# Patient Record
Sex: FEMALE | Race: WHITE | NOT HISPANIC OR LATINO | Employment: UNEMPLOYED | URBAN - METROPOLITAN AREA
[De-identification: names, ages, dates, MRNs, and addresses within clinical notes are randomized per-mention and may not be internally consistent; named-entity substitution may affect disease eponyms.]

---

## 2023-11-06 ENCOUNTER — INITIAL PRENATAL (OUTPATIENT)
Dept: OBSTETRICS AND GYNECOLOGY | Facility: HOSPITAL | Age: 28
End: 2023-11-06
Payer: COMMERCIAL

## 2023-11-06 ENCOUNTER — HOSPITAL ENCOUNTER (OUTPATIENT)
Dept: WOMENS IMAGING | Facility: HOSPITAL | Age: 28
Discharge: HOME | End: 2023-11-06
Admitting: OBSTETRICS & GYNECOLOGY
Payer: COMMERCIAL

## 2023-11-06 VITALS
WEIGHT: 210.2 LBS | HEIGHT: 70 IN | SYSTOLIC BLOOD PRESSURE: 110 MMHG | BODY MASS INDEX: 30.09 KG/M2 | DIASTOLIC BLOOD PRESSURE: 67 MMHG

## 2023-11-06 DIAGNOSIS — O36.80X0 ENCOUNTER TO DETERMINE FETAL VIABILITY OF PREGNANCY, SINGLE OR UNSPECIFIED FETUS: ICD-10-CM

## 2023-11-06 DIAGNOSIS — K59.04 CHRONIC IDIOPATHIC CONSTIPATION: ICD-10-CM

## 2023-11-06 DIAGNOSIS — O21.9 NAUSEA AND VOMITING OF PREGNANCY, ANTEPARTUM: ICD-10-CM

## 2023-11-06 DIAGNOSIS — O36.80X0 ENCOUNTER TO DETERMINE FETAL VIABILITY OF PREGNANCY, SINGLE OR UNSPECIFIED FETUS: Primary | ICD-10-CM

## 2023-11-06 LAB
BILIRUB BLD-MCNC: NEGATIVE MG/DL
CLARITY, POC: ABNORMAL
COLOR UR: YELLOW
GLUCOSE UR STRIP-MCNC: NEGATIVE MG/DL
KETONES UR QL: NEGATIVE
LEUKOCYTE EST, POC: NEGATIVE
NITRITE UR-MCNC: POSITIVE MG/ML
PH UR: 5 [PH] (ref 5–8)
PROT UR STRIP-MCNC: NEGATIVE MG/DL
RBC # UR STRIP: NEGATIVE /UL
SP GR UR: 1.01 (ref 1–1.03)
UROBILINOGEN UR QL: NORMAL

## 2023-11-06 PROCEDURE — 76801 OB US < 14 WKS SINGLE FETUS: CPT

## 2023-11-06 PROCEDURE — 87591 N.GONORRHOEAE DNA AMP PROB: CPT | Performed by: OBSTETRICS & GYNECOLOGY

## 2023-11-06 PROCEDURE — 87661 TRICHOMONAS VAGINALIS AMPLIF: CPT | Performed by: OBSTETRICS & GYNECOLOGY

## 2023-11-06 PROCEDURE — 87491 CHLMYD TRACH DNA AMP PROBE: CPT | Performed by: OBSTETRICS & GYNECOLOGY

## 2023-11-06 RX ORDER — ONDANSETRON 8 MG/1
8 TABLET, ORALLY DISINTEGRATING ORAL EVERY 8 HOURS PRN
Qty: 20 TABLET | Refills: 6 | Status: SHIPPED | OUTPATIENT
Start: 2023-11-06

## 2023-11-06 RX ORDER — POLYETHYLENE GLYCOL 3350 17 G/17G
17 POWDER, FOR SOLUTION ORAL DAILY
Qty: 510 G | Refills: 6 | Status: SHIPPED | OUTPATIENT
Start: 2023-11-06

## 2023-11-06 NOTE — PROGRESS NOTES
Pt here for New OB intake.  Will schedule next appointment today. Discussed new pt intake and materials.

## 2023-11-08 PROBLEM — Z3A.08 8 WEEKS GESTATION OF PREGNANCY: Status: ACTIVE | Noted: 2023-11-08

## 2023-11-08 LAB
C TRACH RRNA SPEC QL NAA+PROBE: NEGATIVE
N GONORRHOEA RRNA SPEC QL NAA+PROBE: NEGATIVE
T VAGINALIS RRNA SPEC QL NAA+PROBE: NEGATIVE

## 2023-11-22 PROBLEM — O21.9 NAUSEA AND VOMITING OF PREGNANCY, ANTEPARTUM: Status: ACTIVE | Noted: 2023-11-22

## 2023-11-22 PROBLEM — K59.04 CHRONIC IDIOPATHIC CONSTIPATION: Status: ACTIVE | Noted: 2023-11-22

## 2023-11-22 NOTE — PROGRESS NOTES
New Prenatal Visit     Patient Name: Marilynn Call  : 1995   MRN: [id number]     Subjective     Marilynn Call is a 28 y.o.  at 7w5d who is here today for initial prenatal visit. LMP 2023. She had a UPT+ in early October. She reports some lower abdominal cramping. No VB. She is having significant constipation already and nausea.      OB History    Para Term  AB Living   3 2 2 0 0 2   SAB IAB Ectopic Molar Multiple Live Births   0 0 0 0 0 2   Obstetric Comments   Fob #1 - Pregnancy #1 and #2        Past GYN History: Normal menses, although lighter the last few months, h/o ovarian cysts, s/p normal Pap in 2023.   Does have issues with mild prolapse after last delivery.     Past Medical History:   Diagnosis Date    Feeling tired     pt reports feeling tired all the time and shaky    Ovarian cyst     right and left    Post partum depression     vaginal delivery    Vaginal delivery     x2 - with laceration       History reviewed. No pertinent surgical history.    History reviewed. No pertinent family history.    Social History     Socioeconomic History    Marital status:    Tobacco Use    Smoking status: Never    Smokeless tobacco: Never   Vaping Use    Vaping Use: Never used   Substance and Sexual Activity    Alcohol use: Never    Drug use: Never    Sexual activity: Yes     Partners: Male       Current Outpatient Medications   Medication Sig Dispense Refill    Prenatal MV-Min-Fe Fum-FA-DHA (PRENATAL 1 PO) Take 1 tablet by mouth 1 (One) Time.      ondansetron ODT (ZOFRAN-ODT) 8 MG disintegrating tablet Place 1 tablet on the tongue Every 8 (Eight) Hours As Needed for Nausea or Vomiting. 20 tablet 6    polyethylene glycol (MIRALAX) 17 GM/SCOOP powder Take 17 g by mouth Daily. Start with 1/2 capful for the first 3 days. If no bowel movement, increase to 1 capful daily. Titrate to results. 510 g 6     No current facility-administered medications for this visit.  "      Allergies:   No Known Allergies    Objective     Vitals:    23 1514 23 1516 23 1519   BP: (!) 86/56  110/67   Weight: 95.3 kg (210 lb 3.2 oz)     Height:  176.5 cm (69.5\")      Body mass index is 30.6 kg/m².    Physical Exam  Constitutional:       Appearance: Normal appearance. She is normal weight.   HENT:      Head: Normocephalic and atraumatic.   Pulmonary:      Effort: Pulmonary effort is normal.   Musculoskeletal:         General: Normal range of motion.   Neurological:      General: No focal deficit present.      Mental Status: She is alert and oriented to person, place, and time.   Psychiatric:         Mood and Affect: Mood normal.         Behavior: Behavior normal.         Thought Content: Thought content normal.         Judgment: Judgment normal.       Procedures  Ultrasound today -- Viable SIUP, S=D at 7 weeks 5 days.   Assessment / Plan      Assessment/Plan:   Diagnoses and all orders for this visit:    1. Encounter to determine fetal viability of pregnancy, single or unspecified fetus (Primary)  -     [facility]; Future  -     Critical access hospital  [facility]; Future  -     POC Urinalysis Dipstick  -     Chlamydia trachomatis, Neisseria gonorrhoeae, Trichomonas vaginalis, PCR - Swab, Vagina; Future    2. Nausea and vomiting of pregnancy, antepartum  -     ondansetron ODT (ZOFRAN-ODT) 8 MG disintegrating tablet; Place 1 tablet on the tongue Every 8 (Eight) Hours As Needed for Nausea or Vomiting.  Dispense: 20 tablet; Refill: 6  -     POC Urinalysis Dipstick    3. Chronic idiopathic constipation  -     polyethylene glycol (MIRALAX) 17 GM/SCOOP powder; Take 17 g by mouth Daily. Start with 1/2 capful for the first 3 days. If no bowel movement, increase to 1 capful daily. Titrate to results.  Dispense: 510 g; Refill: 6  -     POC Urinalysis Dipstick        Follow Up:   Return in about 4 weeks (around 2023).    I spent 30 minutes caring for Marilynn on this " date of service. This time includes time spent by me in the following activities: preparing for the visit, obtaining and/or reviewing a separately obtained history, performing a medically appropriate examination and/or evaluation , counseling and educating the patient/family/caregiver, ordering medications, tests, or procedures, documenting information in the medical record, and independently interpreting results and communicating that information with the patient/family/caregiver    Ny Gutierrez MD

## 2023-12-05 ENCOUNTER — HOSPITAL ENCOUNTER (OUTPATIENT)
Dept: WOMENS IMAGING | Facility: HOSPITAL | Age: 28
Discharge: HOME | End: 2023-12-05
Admitting: OBSTETRICS & GYNECOLOGY
Payer: COMMERCIAL

## 2023-12-05 ENCOUNTER — ROUTINE PRENATAL (OUTPATIENT)
Dept: OBSTETRICS AND GYNECOLOGY | Facility: HOSPITAL | Age: 28
End: 2023-12-05
Payer: COMMERCIAL

## 2023-12-05 ENCOUNTER — LAB (OUTPATIENT)
Dept: LAB | Facility: HOSPITAL | Age: 28
End: 2023-12-05
Payer: COMMERCIAL

## 2023-12-05 VITALS — WEIGHT: 213.8 LBS | DIASTOLIC BLOOD PRESSURE: 54 MMHG | BODY MASS INDEX: 31.12 KG/M2 | SYSTOLIC BLOOD PRESSURE: 118 MMHG

## 2023-12-05 DIAGNOSIS — O36.80X0 ENCOUNTER TO DETERMINE FETAL VIABILITY OF PREGNANCY, SINGLE OR UNSPECIFIED FETUS: ICD-10-CM

## 2023-12-05 DIAGNOSIS — K21.00 GASTROESOPHAGEAL REFLUX DISEASE WITH ESOPHAGITIS, UNSPECIFIED WHETHER HEMORRHAGE: ICD-10-CM

## 2023-12-05 DIAGNOSIS — K59.04 CHRONIC IDIOPATHIC CONSTIPATION: ICD-10-CM

## 2023-12-05 DIAGNOSIS — O21.9 NAUSEA AND VOMITING OF PREGNANCY, ANTEPARTUM: ICD-10-CM

## 2023-12-05 DIAGNOSIS — Z3A.11 11 WEEKS GESTATION OF PREGNANCY: Primary | ICD-10-CM

## 2023-12-05 DIAGNOSIS — Z3A.11 11 WEEKS GESTATION OF PREGNANCY: ICD-10-CM

## 2023-12-05 LAB
ABO GROUP BLD: NORMAL
BASOPHILS # BLD AUTO: 0 10*3/MM3 (ref 0–0.2)
BASOPHILS NFR BLD AUTO: 0 % (ref 0–1.5)
BILIRUB BLD-MCNC: NEGATIVE MG/DL
BLD GP AB SCN SERPL QL: NEGATIVE
CLARITY, POC: CLEAR
COLOR UR: YELLOW
DEPRECATED RDW RBC AUTO: 37.8 FL (ref 37–54)
EOSINOPHIL # BLD AUTO: 0.03 10*3/MM3 (ref 0–0.4)
EOSINOPHIL NFR BLD AUTO: 0.5 % (ref 0.3–6.2)
ERYTHROCYTE [DISTWIDTH] IN BLOOD BY AUTOMATED COUNT: 11.8 % (ref 12.3–15.4)
GLUCOSE UR STRIP-MCNC: NEGATIVE MG/DL
HBV SURFACE AG SERPL QL IA: NORMAL
HCT VFR BLD AUTO: 35 % (ref 34–46.6)
HCV AB SER DONR QL: NORMAL
HGB BLD-MCNC: 11.4 G/DL (ref 12–15.9)
HIV 1+2 AB+HIV1 P24 AG SERPL QL IA: NORMAL
IMM GRANULOCYTES # BLD AUTO: 0.01 10*3/MM3 (ref 0–0.05)
IMM GRANULOCYTES NFR BLD AUTO: 0.2 % (ref 0–0.5)
KETONES UR QL: NEGATIVE
LEUKOCYTE EST, POC: NEGATIVE
LYMPHOCYTES # BLD AUTO: 1.29 10*3/MM3 (ref 0.7–3.1)
LYMPHOCYTES NFR BLD AUTO: 22.6 % (ref 19.6–45.3)
MCH RBC QN AUTO: 28.2 PG (ref 26.6–33)
MCHC RBC AUTO-ENTMCNC: 32.6 G/DL (ref 31.5–35.7)
MCV RBC AUTO: 86.6 FL (ref 79–97)
MONOCYTES # BLD AUTO: 0.47 10*3/MM3 (ref 0.1–0.9)
MONOCYTES NFR BLD AUTO: 8.2 % (ref 5–12)
NEUTROPHILS NFR BLD AUTO: 3.92 10*3/MM3 (ref 1.7–7)
NEUTROPHILS NFR BLD AUTO: 68.5 % (ref 42.7–76)
NITRITE UR-MCNC: NEGATIVE MG/ML
NRBC BLD AUTO-RTO: 0 /100 WBC (ref 0–0.2)
PH UR: 7 [PH] (ref 5–8)
PLATELET # BLD AUTO: 201 10*3/MM3 (ref 140–450)
PMV BLD AUTO: 9.7 FL (ref 6–12)
PROT UR STRIP-MCNC: NEGATIVE MG/DL
RBC # BLD AUTO: 4.04 10*6/MM3 (ref 3.77–5.28)
RBC # UR STRIP: NEGATIVE /UL
RH BLD: POSITIVE
RPR SER QL: NORMAL
SP GR UR: 1 (ref 1–1.03)
UROBILINOGEN UR QL: NORMAL
WBC NRBC COR # BLD AUTO: 5.72 10*3/MM3 (ref 3.4–10.8)

## 2023-12-05 PROCEDURE — 86803 HEPATITIS C AB TEST: CPT

## 2023-12-05 PROCEDURE — 76801 OB US < 14 WKS SINGLE FETUS: CPT

## 2023-12-05 PROCEDURE — 80081 OBSTETRIC PANEL INC HIV TSTG: CPT

## 2023-12-05 PROCEDURE — 76813 OB US NUCHAL MEAS 1 GEST: CPT

## 2023-12-05 PROCEDURE — 36415 COLL VENOUS BLD VENIPUNCTURE: CPT

## 2023-12-05 RX ORDER — FAMOTIDINE 40 MG/1
40 TABLET, FILM COATED ORAL 2 TIMES DAILY
Qty: 60 TABLET | Refills: 6 | Status: SHIPPED | OUTPATIENT
Start: 2023-12-05

## 2023-12-05 RX ORDER — METOCLOPRAMIDE 5 MG/1
5 TABLET ORAL
Qty: 120 TABLET | Refills: 6 | Status: SHIPPED | OUTPATIENT
Start: 2023-12-05

## 2023-12-05 RX ORDER — OMEPRAZOLE 40 MG/1
40 CAPSULE, DELAYED RELEASE ORAL 2 TIMES DAILY
Qty: 30 CAPSULE | Refills: 6 | Status: SHIPPED | OUTPATIENT
Start: 2023-12-05

## 2023-12-05 NOTE — PROGRESS NOTES
Pt reports food keeps coming up in her throat and she has to constantly drink something keep it down .Next f/u to be scheduled   , Needs NIPT and labs today

## 2023-12-06 PROBLEM — K21.00 GASTROESOPHAGEAL REFLUX DISEASE WITH ESOPHAGITIS: Status: ACTIVE | Noted: 2023-12-06

## 2023-12-06 LAB — RUBV IGG SERPL IA-ACNC: 1.59 INDEX

## 2023-12-06 NOTE — ASSESSMENT & PLAN NOTE
Food is regurgitating into throat multiple times per day and causing her to throw up if she doesn't wash it back down with water quickly enough. Does not have sensation of acid or burning associated with it.      - Will try Reglan to see if helps, warned of weird side effects some people have   - Will also try Prilosec and Pepcid as I have had patients have regurgitation without sensation of acid that was helped by this   - Encourage small frequent meals

## 2023-12-06 NOTE — ASSESSMENT & PLAN NOTE
"Still a significant problem. Having difficulty eating because feels \"full\" and now having issues with regurgitating of food. Using Miralax currently.    - Will try Reglan to see if helps  "

## 2023-12-06 NOTE — PROGRESS NOTES
"     Follow-Up Prenatal Visit     Patient Name: Marilynn Call  : 1995   MRN: [id number]     Subjective     Marilynn Call is a 28 y.o.  at 11w6d who is here today for follow-up prenatal visit and ultrasound for NT assessment.     Patient reports feeling food regurgitate into lower throat multiple times per day and if she doesn't drink water quickly, it causes her to throw up. No associated burning or acid sensation but does have a sore throat. Did have one episode of throwing up blood.     No cramping, LOF, VB.      Objective     Vitals:    23 1022   BP: 118/54   Weight: 97 kg (213 lb 12.8 oz)     Body mass index is 31.12 kg/m².    Physical Exam  Constitutional:       Appearance: Normal appearance. She is normal weight.   HENT:      Head: Normocephalic and atraumatic.   Pulmonary:      Effort: Pulmonary effort is normal.   Musculoskeletal:         General: Normal range of motion.   Neurological:      General: No focal deficit present.      Mental Status: She is alert and oriented to person, place, and time.   Psychiatric:         Mood and Affect: Mood normal.         Behavior: Behavior normal.         Thought Content: Thought content normal.         Judgment: Judgment normal.         Lab Results   Component Value Date    HCT 35.0 2023    HGB 11.4 (L) 2023     2023       Procedures  U/S today with S=D, nl URIAH, placenta anterior, CL WNL, NT 1.6mm, early anatomy appropriate.     Assessment / Plan      Assessment/Plan:   Diagnoses and all orders for this visit:    1. 11 weeks gestation of pregnancy (Primary)  -     POC Urinalysis Dipstick    2. Nausea and vomiting of pregnancy, antepartum  -     metoclopramide (Reglan) 5 MG tablet; Take 1 tablet by mouth 4 (Four) Times a Day Before Meals & at Bedtime.  Dispense: 120 tablet; Refill: 6    3. Chronic idiopathic constipation  Assessment & Plan:  Still a significant problem. Having difficulty eating because feels \"full\" and now having " issues with regurgitating of food. Using Miralax currently.    - Will try Reglan to see if helps    Orders:  -     metoclopramide (Reglan) 5 MG tablet; Take 1 tablet by mouth 4 (Four) Times a Day Before Meals & at Bedtime.  Dispense: 120 tablet; Refill: 6    4. Gastroesophageal reflux disease with esophagitis, unspecified whether hemorrhage  Assessment & Plan:  Food is regurgitating into throat multiple times per day and causing her to throw up if she doesn't wash it back down with water quickly enough. Does not have sensation of acid or burning associated with it.      - Will try Reglan to see if helps, warned of weird side effects some people have   - Will also try Prilosec and Pepcid as I have had patients have regurgitation without sensation of acid that was helped by this   - Encourage small frequent meals    Orders:  -     omeprazole (priLOSEC) 40 MG capsule; Take 1 capsule by mouth 2 (Two) Times a Day. 30 minutes prior to meal.  Dispense: 30 capsule; Refill: 6  -     famotidine (Pepcid) 40 MG tablet; Take 1 tablet by mouth 2 (Two) Times a Day.  Dispense: 60 tablet; Refill: 6  -     metoclopramide (Reglan) 5 MG tablet; Take 1 tablet by mouth 4 (Four) Times a Day Before Meals & at Bedtime.  Dispense: 120 tablet; Refill: 6        Follow Up:   Return in about 4 weeks (around 1/2/2024).    I spent 30 minutes caring for Marilynn on this date of service. This time includes time spent by me in the following activities: preparing for the visit, reviewing tests, obtaining and/or reviewing a separately obtained history, counseling and educating the patient/family/caregiver, ordering medications, tests, or procedures, and documenting information in the medical record    Ny Gutierrez MD

## 2023-12-12 ENCOUNTER — TELEPHONE (OUTPATIENT)
Dept: OBSTETRICS AND GYNECOLOGY | Facility: HOSPITAL | Age: 28
End: 2023-12-12
Payer: COMMERCIAL

## 2023-12-13 ENCOUNTER — TELEPHONE (OUTPATIENT)
Dept: OBSTETRICS AND GYNECOLOGY | Facility: HOSPITAL | Age: 28
End: 2023-12-13
Payer: COMMERCIAL

## 2023-12-13 LAB — REF LAB TEST METHOD: NORMAL

## 2023-12-13 NOTE — TELEPHONE ENCOUNTER
Received call from patient requesting I tell her mother the gender on her NIPT.  Patients mother, Sujatha came on the phone and identified herself.  Gender told to mother. Patient denies any other needs.  Shruthi Parikh RN

## 2023-12-15 DIAGNOSIS — K21.00 GASTROESOPHAGEAL REFLUX DISEASE WITH ESOPHAGITIS, UNSPECIFIED WHETHER HEMORRHAGE: ICD-10-CM

## 2023-12-15 DIAGNOSIS — O21.9 NAUSEA AND VOMITING OF PREGNANCY, ANTEPARTUM: Primary | ICD-10-CM

## 2023-12-15 DIAGNOSIS — K59.04 CHRONIC IDIOPATHIC CONSTIPATION: ICD-10-CM

## 2024-01-02 ENCOUNTER — LAB (OUTPATIENT)
Dept: LAB | Facility: HOSPITAL | Age: 29
End: 2024-01-02
Payer: COMMERCIAL

## 2024-01-02 ENCOUNTER — ROUTINE PRENATAL (OUTPATIENT)
Dept: OBSTETRICS AND GYNECOLOGY | Facility: HOSPITAL | Age: 29
End: 2024-01-02
Payer: COMMERCIAL

## 2024-01-02 VITALS — BODY MASS INDEX: 30.86 KG/M2 | SYSTOLIC BLOOD PRESSURE: 113 MMHG | WEIGHT: 212 LBS | DIASTOLIC BLOOD PRESSURE: 56 MMHG

## 2024-01-02 DIAGNOSIS — K21.00 GASTROESOPHAGEAL REFLUX DISEASE WITH ESOPHAGITIS WITHOUT HEMORRHAGE: Primary | ICD-10-CM

## 2024-01-02 DIAGNOSIS — Z3A.15 15 WEEKS GESTATION OF PREGNANCY: ICD-10-CM

## 2024-01-02 DIAGNOSIS — G43.809 OTHER MIGRAINE WITHOUT STATUS MIGRAINOSUS, NOT INTRACTABLE: ICD-10-CM

## 2024-01-02 LAB
BILIRUB BLD-MCNC: NEGATIVE MG/DL
CLARITY, POC: CLEAR
COLOR UR: YELLOW
GLUCOSE UR STRIP-MCNC: NEGATIVE MG/DL
KETONES UR QL: NEGATIVE
LEUKOCYTE EST, POC: NEGATIVE
NITRITE UR-MCNC: NEGATIVE MG/ML
PH UR: 6 [PH] (ref 5–8)
PROT UR STRIP-MCNC: ABNORMAL MG/DL
RBC # UR STRIP: NEGATIVE /UL
SP GR UR: 1.02 (ref 1–1.03)
TSH SERPL DL<=0.05 MIU/L-ACNC: 1.09 UIU/ML (ref 0.27–4.2)
UROBILINOGEN UR QL: ABNORMAL

## 2024-01-02 PROCEDURE — 84443 ASSAY THYROID STIM HORMONE: CPT | Performed by: OBSTETRICS & GYNECOLOGY

## 2024-01-02 PROCEDURE — 36415 COLL VENOUS BLD VENIPUNCTURE: CPT | Performed by: OBSTETRICS & GYNECOLOGY

## 2024-01-02 NOTE — PROGRESS NOTES
Pt denies vaginal bleeding, cramping, or loss of fluid. NIPT negative. Next appointment here at [facility] is 1/30/24.

## 2024-01-05 LAB — Lab: NORMAL

## 2024-01-09 PROBLEM — G43.909 MIGRAINE WITHOUT STATUS MIGRAINOSUS, NOT INTRACTABLE: Status: ACTIVE | Noted: 2024-01-09

## 2024-01-09 NOTE — PROGRESS NOTES
Follow-Up Prenatal Visit     Patient Name: Marilynn Call  : 1995   MRN: [id number]     Subjective     Marilynn Call is a 28 y.o.  at 16w6d who is here today for follow-up prenatal visit. She is still suffering with feeling food lodged in her throat that is making her vomit. Had treatment for UTI with Keflex approximately 2wks ago. Doing well now. Also with increasing migraines.    Denies LOF/VB/cramping.      Objective     Vitals:    24 1035   BP: 113/56   Weight: 96.2 kg (212 lb)     Body mass index is 30.86 kg/m².    Physical Exam  Constitutional:       Appearance: Normal appearance. She is normal weight.   HENT:      Head: Normocephalic and atraumatic.   Pulmonary:      Effort: Pulmonary effort is normal.   Musculoskeletal:         General: Normal range of motion.   Neurological:      General: No focal deficit present.      Mental Status: She is alert and oriented to person, place, and time.   Psychiatric:         Mood and Affect: Mood normal.         Behavior: Behavior normal.         Thought Content: Thought content normal.         Judgment: Judgment normal.     FHR -- 140's, lots of audible movement  FH -- below umbilicus    Lab Results   Component Value Date    HCT 35.0 2023    HGB 11.4 (L) 2023     2023    TSH 1.090 2024     Assessment / Plan      Assessment/Plan:   Doing well overall. S/p treatment for UTI 2wks ago. Feeling fine now. Plan for anatomy survey at next visit.     Diagnoses and all orders for this visit:    1. Gastroesophageal reflux disease with esophagitis without hemorrhage (Primary)  Assessment & Plan:  Stopped Reglan as it didn't seem to help. Still taking Pepcid and Prilosec, but still occurring.       2. 15 weeks gestation of pregnancy  -     POC Urinalysis Dipstick  -     Alpha Fetoprotein, Maternal  -     TSH  -     Cancel: [facility]; Future    3. Other migraine without status migrainosus, not  intractable  Assessment & Plan:  Recommend patient take Magnesium BID.               Follow Up:   No follow-ups on file.    I spent 25 minutes caring for Marilynn on this date of service. This time includes time spent by me in the following activities: preparing for the visit, reviewing tests, obtaining and/or reviewing a separately obtained history, performing a medically appropriate examination and/or evaluation , counseling and educating the patient/family/caregiver, ordering medications, tests, or procedures, and documenting information in the medical record    Ny Gutierrez MD

## 2024-01-25 ENCOUNTER — TELEPHONE (OUTPATIENT)
Dept: OBSTETRICS AND GYNECOLOGY | Facility: HOSPITAL | Age: 29
End: 2024-01-25
Payer: COMMERCIAL

## 2024-01-25 NOTE — TELEPHONE ENCOUNTER
Spoke to patient, who said she was going down the stairs outside today and tripped on a brick. Pt states she caught herself with her hands holding the side rail. Pt states she did not fall or hit her abdomen on anything. Pt denies vaginal bleeding or abdominal pain. Pt agrees to notify MD with vaginal bleeding or abdominal pain, or with any concerns.

## 2024-01-30 ENCOUNTER — ROUTINE PRENATAL (OUTPATIENT)
Dept: OBSTETRICS AND GYNECOLOGY | Facility: HOSPITAL | Age: 29
End: 2024-01-30
Payer: COMMERCIAL

## 2024-01-30 ENCOUNTER — LAB (OUTPATIENT)
Dept: LAB | Facility: HOSPITAL | Age: 29
End: 2024-01-30
Payer: COMMERCIAL

## 2024-01-30 ENCOUNTER — HOSPITAL ENCOUNTER (OUTPATIENT)
Dept: WOMENS IMAGING | Facility: HOSPITAL | Age: 29
Discharge: HOME | End: 2024-01-30
Payer: COMMERCIAL

## 2024-01-30 VITALS — BODY MASS INDEX: 31.61 KG/M2 | WEIGHT: 217.2 LBS | SYSTOLIC BLOOD PRESSURE: 116 MMHG | DIASTOLIC BLOOD PRESSURE: 53 MMHG

## 2024-01-30 DIAGNOSIS — K21.00 GASTROESOPHAGEAL REFLUX DISEASE WITH ESOPHAGITIS, UNSPECIFIED WHETHER HEMORRHAGE: ICD-10-CM

## 2024-01-30 DIAGNOSIS — O21.9 NAUSEA AND VOMITING OF PREGNANCY, ANTEPARTUM: ICD-10-CM

## 2024-01-30 DIAGNOSIS — Z3A.20 20 WEEKS GESTATION OF PREGNANCY: Primary | ICD-10-CM

## 2024-01-30 DIAGNOSIS — Z3A.20 20 WEEKS GESTATION OF PREGNANCY: ICD-10-CM

## 2024-01-30 DIAGNOSIS — K59.04 CHRONIC IDIOPATHIC CONSTIPATION: ICD-10-CM

## 2024-01-30 LAB
25(OH)D3 SERPL-MCNC: 32.9 NG/ML (ref 30–100)
DEPRECATED RDW RBC AUTO: 38.8 FL (ref 37–54)
ERYTHROCYTE [DISTWIDTH] IN BLOOD BY AUTOMATED COUNT: 12.7 % (ref 12.3–15.4)
FERRITIN SERPL-MCNC: 29.9 NG/ML (ref 13–150)
HCT VFR BLD AUTO: 30.9 % (ref 34–46.6)
HGB BLD-MCNC: 10.5 G/DL (ref 12–15.9)
MCH RBC QN AUTO: 29 PG (ref 26.6–33)
MCHC RBC AUTO-ENTMCNC: 34 G/DL (ref 31.5–35.7)
MCV RBC AUTO: 85.4 FL (ref 79–97)
PLATELET # BLD AUTO: 191 10*3/MM3 (ref 140–450)
PMV BLD AUTO: 9.9 FL (ref 6–12)
RBC # BLD AUTO: 3.62 10*6/MM3 (ref 3.77–5.28)
WBC NRBC COR # BLD AUTO: 6.05 10*3/MM3 (ref 3.4–10.8)

## 2024-01-30 PROCEDURE — 85027 COMPLETE CBC AUTOMATED: CPT

## 2024-01-30 PROCEDURE — 76811 OB US DETAILED SNGL FETUS: CPT

## 2024-01-30 PROCEDURE — 82306 VITAMIN D 25 HYDROXY: CPT

## 2024-01-30 PROCEDURE — 82728 ASSAY OF FERRITIN: CPT

## 2024-01-30 PROCEDURE — 36415 COLL VENOUS BLD VENIPUNCTURE: CPT

## 2024-02-02 RX ORDER — FERROUS SULFATE 325(65) MG
325 TABLET ORAL
Qty: 30 TABLET | Refills: 5 | Status: SHIPPED | OUTPATIENT
Start: 2024-02-02

## 2024-02-02 NOTE — PROGRESS NOTES
"    Maternal/Fetal Medicine Prenatal Note     Name: Marilynn Call    : 1995     MRN: [id number]     Referring Provider: Ny Gutierrez MD    Chief Complaint  Hx of PPD, Hx of Vaginal del x 2     Subjective     History of Present Illness:  Marilynn Call is a 28 y.o.  20w2d who presents today for a prenatal visit.   Patient doing well   No LOF, VB, CTX   For anatomic survey today   NIPS and AFP low risk     DARLYN: Estimated Date of Delivery: 24     ROS:   As noted in HPI.     Past Medical History:   Diagnosis Date    GERD (gastroesophageal reflux disease)     History of Ovarian cyst     right and left    History of postpartum depression     vaginal delivery    History of Vaginal delivery     x2 - with laceration    Hx of migraines       No past surgical history on file.   OB History          3    Para   2    Term   2       0    AB   0    Living   2         SAB   0    IAB   0    Ectopic   0    Molar   0    Multiple   0    Live Births   2          Obstetric Comments   Fob #1 - Pregnancy #1 and #2                Objective     Vital Signs  /53   Wt 98.5 kg (217 lb 3.2 oz)   LMP 2023 (Exact Date)   Estimated body mass index is 31.61 kg/m² as calculated from the following:    Height as of 23: 176.5 cm (69.5\").    Weight as of this encounter: 98.5 kg (217 lb 3.2 oz).    Labs    NNL     Ultrasound Impression:   See viewpoint    Assessment and Plan     Diagnoses and all orders for this visit:    1. 20 weeks gestation of pregnancy (Primary)  -     CBC (No Diff); Future  -     Ferritin; Future  -     Vitamin D 25 Hydroxy; Future  -     Cancel:  [facility]; Future  -     [facility]; Future     Normal appearing anatomic survey   Patient requests checking VitD, Ferritin and CBC (concern for anemia and history of VitD deficiency)   Follow up 4 weeks scheduled     Follow Up  4 weeks     I spent 20 minutes caring for the " patient on the day of service. This included: obtaining or reviewing a separately obtained medical history, reviewing patient records, performing a medically appropriate exam and/or evaluation, counseling or educating the patient/family/caregiver, ordering medications, labs, and/or procedures and documenting such in the medical record. This does not include time spent on review and interpretation of other tests such as fetal ultrasound or the performance of other procedures such as amniocentesis or CVS.    Jenna Cee MD FACOG  Maternal Fetal Medicine, [facility] [place]     2024

## 2024-02-14 PROBLEM — O91.13 ABSCESS OF BREAST ASSOCIATED WITH LACTATION: Status: RESOLVED | Noted: 2017-12-08 | Resolved: 2024-02-14

## 2024-02-14 PROBLEM — H33.319 RETINAL TEAR: Status: RESOLVED | Noted: 2020-04-21 | Resolved: 2024-02-14

## 2024-02-14 PROBLEM — N61.0 ACUTE MASTITIS OF LEFT BREAST: Status: RESOLVED | Noted: 2017-12-08 | Resolved: 2024-02-14

## 2024-02-22 ENCOUNTER — TELEPHONE (OUTPATIENT)
Dept: OBSTETRICS AND GYNECOLOGY | Facility: HOSPITAL | Age: 29
End: 2024-02-22
Payer: COMMERCIAL

## 2024-02-22 ENCOUNTER — MEDICATION THERAPY MANAGEMENT (OUTPATIENT)
Dept: OBSTETRICS AND GYNECOLOGY | Facility: HOSPITAL | Age: 29
End: 2024-02-22
Payer: COMMERCIAL

## 2024-02-22 DIAGNOSIS — O21.9 NAUSEA AND VOMITING OF PREGNANCY, ANTEPARTUM: Primary | ICD-10-CM

## 2024-02-22 RX ORDER — ONDANSETRON 4 MG/1
4 TABLET, FILM COATED ORAL EVERY 8 HOURS PRN
Qty: 30 TABLET | Refills: 1 | Status: SHIPPED | OUTPATIENT
Start: 2024-02-22

## 2024-02-22 NOTE — TELEPHONE ENCOUNTER
Received call from patient stating she has been diagnosed with pneumonia, and tested positive for Flu B.  Patient is complaining of a coung, nausea, sore throat.  She is wanting to know if there is something she can take for her symptoms.  Patient also complains of headache when coughing that tylenol does not relieve.  Patient states she was diagnosed in the ER of [facility].  Patient was prescribed amoxicillin 6.25 ml po to take twice a day.  Spoke with Dr. Myles and he stats he will send in a prescription for zofran to help with the nausea and she can take any over the counter cough syrup that doesn't have DM.  Informed patient of this and she states she is already taking robitussin.  Patient states her symptoms began on Tuesday, Dr. Myles feels it is too late to prescribe tamiflu.  Patient voices understanding.  Shruthi Parikh RN

## 2024-02-26 ENCOUNTER — TELEPHONE (OUTPATIENT)
Dept: OBSTETRICS AND GYNECOLOGY | Facility: HOSPITAL | Age: 29
End: 2024-02-26
Payer: COMMERCIAL

## 2024-02-26 ENCOUNTER — HOSPITAL ENCOUNTER (OUTPATIENT)
Facility: HOSPITAL | Age: 29
Discharge: HOME | End: 2024-02-26
Attending: OBSTETRICS & GYNECOLOGY | Admitting: OBSTETRICS & GYNECOLOGY
Payer: COMMERCIAL

## 2024-02-26 VITALS
SYSTOLIC BLOOD PRESSURE: 109 MMHG | HEART RATE: 89 BPM | OXYGEN SATURATION: 91 % | TEMPERATURE: 98.7 F | BODY MASS INDEX: 31.84 KG/M2 | DIASTOLIC BLOOD PRESSURE: 66 MMHG | RESPIRATION RATE: 18 BRPM | HEIGHT: 69 IN | WEIGHT: 215 LBS

## 2024-02-26 PROBLEM — K76.0 FATTY LIVER IN MOTHER DURING PREGNANCY: Chronic | Status: ACTIVE | Noted: 2024-02-26

## 2024-02-26 PROBLEM — K59.04 CHRONIC IDIOPATHIC CONSTIPATION: Status: ACTIVE | Noted: 2024-02-26

## 2024-02-26 PROBLEM — O26.619 FATTY LIVER IN MOTHER DURING PREGNANCY: Chronic | Status: ACTIVE | Noted: 2024-02-26

## 2024-02-26 PROBLEM — J01.00 ACUTE MAXILLARY SINUSITIS: Status: ACTIVE | Noted: 2024-02-26

## 2024-02-26 LAB
BACTERIA UR QL AUTO: ABNORMAL /HPF
BILIRUB UR QL STRIP: ABNORMAL
CLARITY UR: CLEAR
COLOR UR: ABNORMAL
GLUCOSE UR STRIP-MCNC: NEGATIVE MG/DL
HGB UR QL STRIP.AUTO: NEGATIVE
HYALINE CASTS UR QL AUTO: ABNORMAL /LPF
KETONES UR QL STRIP: NEGATIVE
LEUKOCYTE ESTERASE UR QL STRIP.AUTO: ABNORMAL
NITRITE UR QL STRIP: NEGATIVE
PH UR STRIP.AUTO: 7 [PH] (ref 5–8)
PROT UR QL STRIP: ABNORMAL
RBC # UR STRIP: ABNORMAL /HPF
REF LAB TEST METHOD: ABNORMAL
SP GR UR STRIP: 1.02 (ref 1–1.03)
SQUAMOUS #/AREA URNS HPF: ABNORMAL /HPF
UROBILINOGEN UR QL STRIP: ABNORMAL
WBC # UR STRIP: ABNORMAL /HPF

## 2024-02-26 PROCEDURE — G0463 HOSPITAL OUTPT CLINIC VISIT: HCPCS

## 2024-02-26 PROCEDURE — 81001 URINALYSIS AUTO W/SCOPE: CPT | Performed by: OBSTETRICS & GYNECOLOGY

## 2024-02-26 RX ORDER — BISACODYL 5 MG/1
5 TABLET, DELAYED RELEASE ORAL DAILY PRN
Qty: 30 TABLET | Refills: 1 | Status: SHIPPED | OUTPATIENT
Start: 2024-02-26 | End: 2025-02-25

## 2024-02-26 RX ORDER — SENNOSIDES 8.6 MG
650 CAPSULE ORAL EVERY 8 HOURS PRN
COMMUNITY

## 2024-02-26 RX ORDER — AMOXICILLIN AND CLAVULANATE POTASSIUM 600; 42.9 MG/5ML; MG/5ML
600 POWDER, FOR SUSPENSION ORAL 2 TIMES DAILY
Qty: 100 ML | Refills: 0 | Status: SHIPPED | OUTPATIENT
Start: 2024-02-26 | End: 2024-03-07

## 2024-02-26 RX ORDER — OXYMETAZOLINE HYDROCHLORIDE 0.05 G/100ML
2 SPRAY NASAL 2 TIMES DAILY
Status: DISCONTINUED | OUTPATIENT
Start: 2024-02-26 | End: 2024-02-26 | Stop reason: HOSPADM

## 2024-02-26 RX ORDER — BUTALBITAL, ACETAMINOPHEN AND CAFFEINE 50; 325; 40 MG/1; MG/1; MG/1
2 TABLET ORAL EVERY 4 HOURS PRN
Status: DISCONTINUED | OUTPATIENT
Start: 2024-02-26 | End: 2024-02-26

## 2024-02-26 NOTE — DISCHARGE INSTR - APPOINTMENTS
Please keep your next schedule appointment with Dr Gutierrez. If you have any concerns before your next appointment phone the office or return to [facility].

## 2024-02-26 NOTE — TELEPHONE ENCOUNTER
What has your temperature been?  Do you have dental problems or an abscessed tooth perhaps? Did you get tested for covid at the same time 4 days ago?  I am going to take your message and answers to these questions to our doctors to see what they want you to do....  so margot Subramanian. Emeli Trotter RN

## 2024-02-26 NOTE — TELEPHONE ENCOUNTER
Left message for Ms hobbs to come into to triage on L & d today for evaluation I spoke with the patient about this. Spoke With Matt

## 2024-02-26 NOTE — H&P
Triage H&P    Patient Name: Marilynn Call  : 1995  MRN: [id number]  Date of Service: 2024  Referring Provider: Ny Gutierrez     ID: 28 y.o.  at 23w5d    Chief complaint: severe facial/sinus pain   dysuria    HPI:  Pt with recent pneumonia, Flu B, UTI treated with Keflex and Amoxicillin.  Pt cannot tolerate pills.  Presents now with facial pain in her maxillary sinuses that she states is as severe as childbirth.  Also with discomfort when urinating and chronic constipation.  Pt with known fatty liver.  Is supposed to be taking miralax for constipation but is afraid to because her sister's child had meconium before delivery.      Pregnancy course significant for:    Patient Active Problem List    Diagnosis     Migraine without status migrainosus, not intractable [G43.909]     Gastroesophageal reflux disease with esophagitis [K21.00]     11 weeks gestation of pregnancy [Z3A.11]     Chronic idiopathic constipation [K59.04]     Nausea and vomiting of pregnancy, antepartum [O21.9]     8 weeks gestation of pregnancy [Z3A.08]     Her previous obstetric/gynecological history is noted for  2 previous full term vaginal deliveries .    The following portions of the patients history were reviewed and updated as appropriate: current medications, allergies, past medical history, past surgical history, past family history, past social history, and problem list.       REVIEW OF SYSTEMS  no contractions   Patient reports good fetal movement.  She denies any vaginal bleeding, leakage of fluid, or contractions.  She denies headache, visual changes, or right upper quadrant pain.  All other systems were reviewed and were negative.    Prenatal Labs  Lab Results   Component Value Date    HGB 10.5 (L) 2024    HEPBSAG Non-Reactive 2023    ABO O 2023    RH Positive 2023    ABSCRN Negative 2023    XPE5RST7 Non-Reactive 2023    HEPCVIRUSABY Non-Reactive 2023       OB HISTORY     OB History          3    Para   2    Term   2       0    AB   0    Living   2         SAB   0    IAB   0    Ectopic   0    Molar   0    Multiple   0    Live Births   2          Obstetric Comments   Fob #1 - Pregnancy #1 and #2              PAST MEDICAL HISTORY    Past Medical History:   Diagnosis Date    Abscess of breast associated with lactation 2017    Acute mastitis of left breast 2017    GERD (gastroesophageal reflux disease)     History of Ovarian cyst     right and left    History of postpartum depression     vaginal delivery    History of Vaginal delivery     x2 - with laceration    Hx of migraines     Post partum depression 2021    Retinal tear 2020     PAST SURGICAL HISTORY     has no past surgical history on file.  CURRENT MEDICATIONS    No current facility-administered medications on file prior to encounter.     Current Outpatient Medications on File Prior to Encounter   Medication Sig Dispense Refill    acetaminophen (TYLENOL) 650 MG 8 hr tablet Take 1 tablet by mouth Every 8 (Eight) Hours As Needed for Mild Pain.      cholecalciferol (VITAMIN D3) 250 MCG (44291 UT) capsule Take 1 capsule by mouth Daily. 30 capsule 11    ondansetron (Zofran) 4 MG tablet Take 1 tablet by mouth Every 8 (Eight) Hours As Needed for Nausea or Vomiting. 30 tablet 1    ferrous sulfate 325 (65 FE) MG tablet Take 1 tablet by mouth Daily With Breakfast. 30 tablet 5    polyethylene glycol (MIRALAX) 17 GM/SCOOP powder Take 17 g by mouth Daily. Start with 1/2 capful for the first 3 days. If no bowel movement, increase to 1 capful daily. Titrate to results. 510 g 6    [DISCONTINUED] famotidine (Pepcid) 40 MG tablet Take 1 tablet by mouth 2 (Two) Times a Day. 60 tablet 6    [DISCONTINUED] metoclopramide (Reglan) 5 MG tablet Take 1 tablet by mouth 4 (Four) Times a Day Before Meals & at Bedtime. 120 tablet 6    [DISCONTINUED] omeprazole (priLOSEC) 40 MG capsule Take 1 capsule by  mouth 2 (Two) Times a Day. 30 minutes prior to meal. 30 capsule 6    [DISCONTINUED] Prenatal MV-Min-Fe Fum-FA-DHA (PRENATAL 1 PO) Take 1 tablet by mouth 1 (One) Time.       ALLERGIES    Patient has no known allergies.  SOCIAL HISTORY    Social History     Tobacco Use   Smoking Status Never   Smokeless Tobacco Never     Social History     Substance and Sexual Activity   Alcohol Use Never     Social History     Substance and Sexual Activity   Drug Use Never         PHYSICAL EXAMINATION    Vital Signs Range for the last 24 hours  Temperature: Temp:  [98.7 °F (37.1 °C)] 98.7 °F (37.1 °C)   Temp Source: Temp src: Axillary   BP: BP: (109)/(66) 109/66   Pulse: Heart Rate:  [] 98   Respirations: Resp:  [18] 18   Weight: 97.5 kg (215 lb)     Constitutional:  Well developed, well nourished, no acute distress, well-groomed.  HEENT: Grossly normal.  Respiratory:  Unlabored, normal breath sounds.   Cardiovascular:  Normal rate and rhythm,  Abdomen:  Soft, gravid, nontender.  Uterus: Soft, nontender. Fundus appropriate for dates.  Neurologic:  Alert & oriented x 4,  no focal deficits noted.   Psychiatric:  Speech and behavior appropriate.   Extremities: no cyanosis, clubbing or edema, no evidence of DVT.      External Fetal Monitoring:    Fetal Heart Rate Assessment   Method:     Beats/min:     Baseline:     Varibility:     Accels:     Decels:     Tracing Category:       Uterine Assessment   Method:     Frequency (min):     Ctx Count in 10 min:     Duration:     Intensity: Contraction Intensity: no contractions (per pt report)   Intensity by IUPC:     Resting Tone: Uterine Resting Tone: soft by palpation   Resting Tone by IUPC:     [place] Units:       Labs:      Lab Results (last 24 hours)       Procedure Component Value Units Date/Time    Urinalysis With Culture If Indicated - Urine, Clean Catch [[id number]]  (Abnormal) Collected: 02/26/24 1247    Specimen: Urine, Clean Catch Updated: 02/26/24 1320     Color, UA Dark  Yellow     Appearance, UA Clear     pH, UA 7.0     Specific Gravity, UA 1.021     Glucose, UA Negative     Ketones, UA Negative     Bilirubin, UA Small (1+)     Blood, UA Negative     Protein, UA 30 mg/dL (1+)     Leuk Esterase, UA Trace     Nitrite, UA Negative     Urobilinogen, UA 4.0 E.U./dL    Narrative:      In absence of clinical symptoms, the presence of pyuria, bacteria, and/or nitrites on the urinalysis result does not correlate with infection.    Urinalysis, Microscopic Only - Urine, Clean Catch [[id number]] Collected: 02/26/24 1247    Specimen: Urine, Clean Catch Updated: 02/26/24 1318        Impression CT scan at OSH in 5/7/23      1. Increased intraluminal fluid in the colon and small bowel could represent mild inflammatory/infectious process.    2. Hypodense structures along both adnexa measuring up to 2.3 cm. These probably represent follicles and cysts. However if further assessment is needed pelvic ultrasound could be considered.    3. Hepatosplenomegaly with hepatic steatosis and probable cirrhosis of the liver.    4. Minimal groundglass airspace opacity in the lungs may represent mild atelectasis or infiltrate.    5. Mildly enlarged mesenteric lymph nodes could represent a process such as mesenteric adenitis.    6. Constipation.    Report Sign Date: 5/7/2023 7:16 PM, Electronically Signed By: Aaron Whitman MD  Narrative    PROCEDURE: CT ABDOMEN PELVIS W IV CONTRAST: 5/7/2023    Radiation Optimization: All CT scans at this facility use at least one of these dose optimization techniques: automated exposure control; mA and/or kV adjustment per patient size (includes targeted exams where dose is matched to clinical indication); or iterative reconstruction.    CLINICAL INFORMATION: abdominal pain    Comparison: None.    Visualized osseous structures appear intact. Lung bases show minimal groundglass airspace opacities. No evidence of small bowel obstruction. The appendix is normal.    There are  hypodense structures along both adnexa measuring up to 2.3 cm on the right.    There are mildly enlarged mesenteric lymph nodes.    Kidneys, adrenals, and pancreas appear intact. The gallbladder appears intact. There is splenomegaly with hepatic steatosis and possible cirrhosis.    There is constipation. There is prominent intraluminal fluid in the colon and small bowel.  Exam End: 23 14:12    Specimen Collected: 23 14:13 Last Resulted: 23 19:16   Received From: [facility]        ASSESSMENT/PLAN:  28 y.o. female  at 23w5d with Acute maxillary sinusitis and increased urobilinogen on UA.  As noted on CT scan from  pt with fatty liver - and known constipation both etiologies for UA finding  Patient Active Problem List    Diagnosis     Migraine without status migrainosus, not intractable [G43.909]     Gastroesophageal reflux disease with esophagitis [K21.00]     11 weeks gestation of pregnancy [Z3A.11]     Chronic idiopathic constipation [K59.04]     Nausea and vomiting of pregnancy, antepartum [O21.9]     8 weeks gestation of pregnancy [Z3A.08]    Liquid augmentin  Dulcolax BID  Afrin nasal spray for 3 days only  D/w     Approximately 25 minutes minutes floor time was spent in care of this patient, of which greater than 50% was spent in face-to-face consultation and coordination of care.    Mirna Hendricks MD  2024  13:22 EST

## 2024-02-26 NOTE — TELEPHONE ENCOUNTER
Pt called is miserable, has been on antibiotics for 15 days straight, running low grade temp, face hurting, teeth hurting, not sleeping and thinks she is getting another UTI. What does she need to do

## 2024-02-26 NOTE — TELEPHONE ENCOUNTER
Pt had messaged the office this morning, spoke with one of the nurses regarding tooth pain, low-grade temperature, and possible urinary tract infection. Staff here responded to patient but she did not reply. This nurse called and left a message on patient's phone to please call our office. Will continue to try to reach patient.

## 2024-02-26 NOTE — TELEPHONE ENCOUNTER
"Spoke with Ms. Call, Informed that she needs to come to l & d per Dr Gutierrez to be evaluated for her current symptoms.  Pt with vu. States \"on my way now\"  "

## 2024-03-12 ENCOUNTER — APPOINTMENT (OUTPATIENT)
Dept: MRI IMAGING | Facility: HOSPITAL | Age: 29
End: 2024-03-12
Payer: COMMERCIAL

## 2024-03-12 ENCOUNTER — TELEPHONE (OUTPATIENT)
Dept: OBSTETRICS AND GYNECOLOGY | Facility: HOSPITAL | Age: 29
End: 2024-03-12
Payer: COMMERCIAL

## 2024-03-12 ENCOUNTER — HOSPITAL ENCOUNTER (OUTPATIENT)
Facility: HOSPITAL | Age: 29
Discharge: HOME | End: 2024-03-12
Attending: OBSTETRICS & GYNECOLOGY | Admitting: OBSTETRICS & GYNECOLOGY
Payer: COMMERCIAL

## 2024-03-12 VITALS
HEIGHT: 69 IN | BODY MASS INDEX: 31.84 KG/M2 | RESPIRATION RATE: 18 BRPM | DIASTOLIC BLOOD PRESSURE: 65 MMHG | OXYGEN SATURATION: 95 % | WEIGHT: 215 LBS | TEMPERATURE: 97.8 F | HEART RATE: 83 BPM | SYSTOLIC BLOOD PRESSURE: 113 MMHG

## 2024-03-12 PROBLEM — R51.9 HA (HEADACHE): Status: ACTIVE | Noted: 2024-03-12

## 2024-03-12 LAB
ALBUMIN SERPL-MCNC: 3.5 G/DL (ref 3.5–5.2)
ALBUMIN/GLOB SERPL: 1 G/DL
ALP SERPL-CCNC: 76 U/L (ref 39–117)
ALT SERPL W P-5'-P-CCNC: 9 U/L (ref 1–33)
ANION GAP SERPL CALCULATED.3IONS-SCNC: 11 MMOL/L (ref 5–15)
AST SERPL-CCNC: 13 U/L (ref 1–32)
BASOPHILS # BLD AUTO: 0.02 10*3/MM3 (ref 0–0.2)
BASOPHILS NFR BLD AUTO: 0.2 % (ref 0–1.5)
BILIRUB SERPL-MCNC: 0.2 MG/DL (ref 0–1.2)
BILIRUB UR QL STRIP: NEGATIVE
BUN SERPL-MCNC: 7 MG/DL (ref 6–20)
BUN/CREAT SERPL: 13.2 (ref 7–25)
CALCIUM SPEC-SCNC: 8.6 MG/DL (ref 8.6–10.5)
CHLORIDE SERPL-SCNC: 103 MMOL/L (ref 98–107)
CLARITY UR: CLEAR
CO2 SERPL-SCNC: 22 MMOL/L (ref 22–29)
COLOR UR: YELLOW
CREAT SERPL-MCNC: 0.53 MG/DL (ref 0.57–1)
DEPRECATED RDW RBC AUTO: 43.2 FL (ref 37–54)
EGFRCR SERPLBLD CKD-EPI 2021: 129.4 ML/MIN/1.73
EOSINOPHIL # BLD AUTO: 0.02 10*3/MM3 (ref 0–0.4)
EOSINOPHIL NFR BLD AUTO: 0.2 % (ref 0.3–6.2)
ERYTHROCYTE [DISTWIDTH] IN BLOOD BY AUTOMATED COUNT: 13.2 % (ref 12.3–15.4)
FERRITIN SERPL-MCNC: 33.79 NG/ML (ref 13–150)
GLOBULIN UR ELPH-MCNC: 3.4 GM/DL
GLUCOSE SERPL-MCNC: 103 MG/DL (ref 65–99)
GLUCOSE UR STRIP-MCNC: NEGATIVE MG/DL
HCT VFR BLD AUTO: 31.9 % (ref 34–46.6)
HGB BLD-MCNC: 10.4 G/DL (ref 12–15.9)
HGB UR QL STRIP.AUTO: NEGATIVE
IMM GRANULOCYTES # BLD AUTO: 0.06 10*3/MM3 (ref 0–0.05)
IMM GRANULOCYTES NFR BLD AUTO: 0.6 % (ref 0–0.5)
KETONES UR QL STRIP: NEGATIVE
LEUKOCYTE ESTERASE UR QL STRIP.AUTO: NEGATIVE
LYMPHOCYTES # BLD AUTO: 1.21 10*3/MM3 (ref 0.7–3.1)
LYMPHOCYTES NFR BLD AUTO: 11.3 % (ref 19.6–45.3)
MCH RBC QN AUTO: 29.1 PG (ref 26.6–33)
MCHC RBC AUTO-ENTMCNC: 32.6 G/DL (ref 31.5–35.7)
MCV RBC AUTO: 89.1 FL (ref 79–97)
MONOCYTES # BLD AUTO: 0.79 10*3/MM3 (ref 0.1–0.9)
MONOCYTES NFR BLD AUTO: 7.4 % (ref 5–12)
NEUTROPHILS NFR BLD AUTO: 8.57 10*3/MM3 (ref 1.7–7)
NEUTROPHILS NFR BLD AUTO: 80.3 % (ref 42.7–76)
NITRITE UR QL STRIP: NEGATIVE
NRBC BLD AUTO-RTO: 0 /100 WBC (ref 0–0.2)
PH UR STRIP.AUTO: 7.5 [PH] (ref 5–8)
PLATELET # BLD AUTO: 245 10*3/MM3 (ref 140–450)
PMV BLD AUTO: 9.2 FL (ref 6–12)
POTASSIUM SERPL-SCNC: 4 MMOL/L (ref 3.5–5.2)
PROT SERPL-MCNC: 6.9 G/DL (ref 6–8.5)
PROT UR QL STRIP: NEGATIVE
RBC # BLD AUTO: 3.58 10*6/MM3 (ref 3.77–5.28)
SODIUM SERPL-SCNC: 136 MMOL/L (ref 136–145)
SP GR UR STRIP: 1.01 (ref 1–1.03)
UROBILINOGEN UR QL STRIP: NORMAL
WBC NRBC COR # BLD AUTO: 10.67 10*3/MM3 (ref 3.4–10.8)

## 2024-03-12 PROCEDURE — 96374 THER/PROPH/DIAG INJ IV PUSH: CPT

## 2024-03-12 PROCEDURE — 25810000003 SODIUM CHLORIDE 0.9 % SOLUTION 1,000 ML FLEX CONT: Performed by: OBSTETRICS & GYNECOLOGY

## 2024-03-12 PROCEDURE — 70551 MRI BRAIN STEM W/O DYE: CPT

## 2024-03-12 PROCEDURE — 82728 ASSAY OF FERRITIN: CPT | Performed by: OBSTETRICS & GYNECOLOGY

## 2024-03-12 PROCEDURE — G0378 HOSPITAL OBSERVATION PER HR: HCPCS

## 2024-03-12 PROCEDURE — 59025 FETAL NON-STRESS TEST: CPT

## 2024-03-12 PROCEDURE — 96375 TX/PRO/DX INJ NEW DRUG ADDON: CPT

## 2024-03-12 PROCEDURE — 81003 URINALYSIS AUTO W/O SCOPE: CPT | Performed by: OBSTETRICS & GYNECOLOGY

## 2024-03-12 PROCEDURE — 25010000002 METOCLOPRAMIDE PER 10 MG: Performed by: OBSTETRICS & GYNECOLOGY

## 2024-03-12 PROCEDURE — G0463 HOSPITAL OUTPT CLINIC VISIT: HCPCS

## 2024-03-12 PROCEDURE — 36415 COLL VENOUS BLD VENIPUNCTURE: CPT | Performed by: OBSTETRICS & GYNECOLOGY

## 2024-03-12 PROCEDURE — 80053 COMPREHEN METABOLIC PANEL: CPT | Performed by: OBSTETRICS & GYNECOLOGY

## 2024-03-12 PROCEDURE — 85025 COMPLETE CBC W/AUTO DIFF WBC: CPT | Performed by: OBSTETRICS & GYNECOLOGY

## 2024-03-12 PROCEDURE — 25810000003 LACTATED RINGERS SOLUTION: Performed by: OBSTETRICS & GYNECOLOGY

## 2024-03-12 PROCEDURE — 25010000002 DIPHENHYDRAMINE PER 50 MG: Performed by: OBSTETRICS & GYNECOLOGY

## 2024-03-12 PROCEDURE — 70544 MR ANGIOGRAPHY HEAD W/O DYE: CPT

## 2024-03-12 PROCEDURE — 25010000002 THIAMINE HCL 200 MG/2ML SOLUTION: Performed by: OBSTETRICS & GYNECOLOGY

## 2024-03-12 RX ORDER — SODIUM CHLORIDE 0.9 % (FLUSH) 0.9 %
1-10 SYRINGE (ML) INJECTION AS NEEDED
Status: DISCONTINUED | OUTPATIENT
Start: 2024-03-12 | End: 2024-03-13 | Stop reason: HOSPADM

## 2024-03-12 RX ORDER — SUMATRIPTAN 50 MG/1
50 TABLET, FILM COATED ORAL
Status: DISCONTINUED | OUTPATIENT
Start: 2024-03-12 | End: 2024-03-13 | Stop reason: HOSPADM

## 2024-03-12 RX ORDER — SODIUM CHLORIDE 9 MG/ML
40 INJECTION, SOLUTION INTRAVENOUS AS NEEDED
Status: DISCONTINUED | OUTPATIENT
Start: 2024-03-12 | End: 2024-03-13 | Stop reason: HOSPADM

## 2024-03-12 RX ORDER — DEXTROSE, SODIUM CHLORIDE, SODIUM LACTATE, POTASSIUM CHLORIDE, AND CALCIUM CHLORIDE 5; .6; .31; .03; .02 G/100ML; G/100ML; G/100ML; G/100ML; G/100ML
1000 INJECTION, SOLUTION INTRAVENOUS CONTINUOUS
Status: ACTIVE | OUTPATIENT
Start: 2024-03-12 | End: 2024-03-12

## 2024-03-12 RX ORDER — BUTALBITAL, ACETAMINOPHEN AND CAFFEINE 50; 325; 40 MG/1; MG/1; MG/1
2 TABLET ORAL EVERY 4 HOURS PRN
Status: DISCONTINUED | OUTPATIENT
Start: 2024-03-12 | End: 2024-03-13 | Stop reason: HOSPADM

## 2024-03-12 RX ORDER — METOCLOPRAMIDE HYDROCHLORIDE 5 MG/ML
10 INJECTION INTRAMUSCULAR; INTRAVENOUS EVERY 6 HOURS
Status: DISCONTINUED | OUTPATIENT
Start: 2024-03-12 | End: 2024-03-13 | Stop reason: HOSPADM

## 2024-03-12 RX ORDER — THIAMINE HYDROCHLORIDE 100 MG/ML
100 INJECTION, SOLUTION INTRAMUSCULAR; INTRAVENOUS ONCE
Status: COMPLETED | OUTPATIENT
Start: 2024-03-12 | End: 2024-03-12

## 2024-03-12 RX ORDER — SODIUM CHLORIDE 0.9 % (FLUSH) 0.9 %
10 SYRINGE (ML) INJECTION EVERY 12 HOURS SCHEDULED
Status: DISCONTINUED | OUTPATIENT
Start: 2024-03-12 | End: 2024-03-13 | Stop reason: HOSPADM

## 2024-03-12 RX ORDER — DIPHENHYDRAMINE HYDROCHLORIDE 50 MG/ML
25 INJECTION INTRAMUSCULAR; INTRAVENOUS ONCE
Status: COMPLETED | OUTPATIENT
Start: 2024-03-12 | End: 2024-03-12

## 2024-03-12 RX ADMIN — METOCLOPRAMIDE 10 MG: 5 INJECTION, SOLUTION INTRAMUSCULAR; INTRAVENOUS at 17:36

## 2024-03-12 RX ADMIN — THIAMINE HYDROCHLORIDE 100 MG: 100 INJECTION, SOLUTION INTRAMUSCULAR; INTRAVENOUS at 19:35

## 2024-03-12 RX ADMIN — ASCORBIC ACID, VITAMIN A PALMITATE, CHOLECALCIFEROL, THIAMINE HYDROCHLORIDE, RIBOFLAVIN-5 PHOSPHATE SODIUM, PYRIDOXINE HYDROCHLORIDE, NIACINAMIDE, DEXPANTHENOL, ALPHA-TOCOPHEROL ACETATE, VITAMIN K1, FOLIC ACID, BIOTIN, CYANOCOBALAMIN: 200; 3300; 200; 6; 3.6; 6; 40; 15; 10; 150; 600; 60; 5 INJECTION, SOLUTION INTRAVENOUS at 19:06

## 2024-03-12 RX ADMIN — SODIUM CHLORIDE, POTASSIUM CHLORIDE, SODIUM LACTATE AND CALCIUM CHLORIDE 500 ML: 600; 310; 30; 20 INJECTION, SOLUTION INTRAVENOUS at 17:13

## 2024-03-12 RX ADMIN — SUMATRIPTAN SUCCINATE 50 MG: 50 TABLET ORAL at 19:35

## 2024-03-12 RX ADMIN — DIPHENHYDRAMINE HYDROCHLORIDE 25 MG: 50 INJECTION INTRAMUSCULAR; INTRAVENOUS at 19:03

## 2024-03-12 NOTE — H&P
"[facility]  Obstetric History and Physical    Referring Provider: Amarjit Glover DO      Chief Complaint   Patient presents with    Headache       Subjective     Patient is a 28 y.o. female  currently at 25w6d, who presents with complaint of a headache.  Patient reports a 8 out of 10 left-sided throbbing headache for 3 days not relieved with Tylenol.  Patient currently being followed by ENT for acute maxillary sinusitis completed antibiotics p.o. steroids and currently on nasal steroids.  Also reports last month had influenza with a community-acquired pneumonia.  Prenatal care by [facility], thus far has been uncomplicated.    .  Past medical hist significant for chronic idiopathic constipation, GERD, intermittent nausea vomiting pregnancy, and remote history of migraine headaches.    The following portions of the patients history were reviewed and updated as appropriate: current medications, allergies, past medical history, past surgical history, past family history, past social history, and problem list .       Prenatal Information:   Maternal Prenatal Labs  Blood Type No results found for: \"ABO\"   Rh Status No results found for: \"RH\"   Antibody Screen No results found for: \"ABSCRN\"   Gonnorhea No results found for: \"GCCX\"   Chlamydia No results found for: \"CLAMYDCU\"   RPR No results found for: \"RPR\"   Syphilis Antibody No results found for: \"SYPHILIS\"   Rubella No results found for: \"RUBELLAIGGIN\"   Hepatitis B Surface Antigen No results found for: \"HEPBSAG\"   HIV-1 Antibody No results found for: \"LABHIV1\"   Hepatitis C Antibody No results found for: \"HEPCAB\"   Rapid Urin Drug Screen No results found for: \"AMPMETHU\", \"BARBITSCNUR\", \"LABBENZSCN\", \"LABMETHSCN\", \"LABOPIASCN\", \"THCURSCR\", \"COCAINEUR\", \"AMPHETSCREEN\", \"PROPOXSCN\", \"BUPRENORSCNU\", \"METAMPSCNUR\", \"OXYCODONESCN\", \"TRICYCLICSCN\"   Group B Strep Culture No results found for: \"GBSANTIGEN\"           External Prenatal Results       Pregnancy Outside " Results - Transcribed From Office Records - See Scanned Records For Details       Test Value Date Time    ABO  O  23 1154    Rh  Positive  23 1154    Antibody Screen  Negative  23 1154    Varicella IgG       Rubella  1.59 index 23 1154    Hgb  10.4 g/dL 24 1649       10.5 g/dL 24 1218       11.4 g/dL 23 1154    Hct  31.9 % 24 1649       30.9 % 24 1218       35.0 % 23 1154    Glucose Fasting GTT       Glucose Tolerance Test 1 hour       Glucose Tolerance Test 3 hour       Gonorrhea (discrete)  Negative  23 1710    Chlamydia (discrete)  Negative  23 1710    RPR  Non-Reactive  23 1154    VDRL       Syphilis Antibody       HBsAg  Non-Reactive  23 1154    Herpes Simplex Virus PCR       Herpes Simplex VIrus Culture       HIV  Non-Reactive  23 1154    Hep C RNA Quant PCR       Hep C Antibody  Non-Reactive  23 1154    AFP       Group B Strep       GBS Susceptibility to Clindamycin       GBS Susceptibility to Erythromycin       Fetal Fibronectin       Genetic Testing, Maternal Blood                 Drug Screening       Test Value Date Time    Urine Drug Screen       Amphetamine Screen       Barbiturate Screen       Benzodiazepine Screen       Methadone Screen       Phencyclidine Screen       Opiates Screen       THC Screen       Cocaine Screen       Propoxyphene Screen       Buprenorphine Screen       Methamphetamine Screen       Oxycodone Screen       Tricyclic Antidepressants Screen                 Legend    ^: Historical                              Past OB History:       OB History    Para Term  AB Living   3 2 2 0 0 2   SAB IAB Ectopic Molar Multiple Live Births   0 0 0 0 0 2      # Outcome Date GA Lbr Rafael/2nd Weight Sex Type Anes PTL Lv   3 Current            2 Term 10/26/20 39w1d  4111 g (9 lb 1 oz) M Vag-Spont EPI  ADITYA      Complications: Perineal laceration during delivery   1 Term 02/15/17 38w0d  3799 g (8  lb 6 oz) M Vag-Spont EPI  ADITYA      Complications: Perineal laceration during delivery      Obstetric Comments   Fob #1 - Pregnancy #1 and #2        Past Medical History: Past Medical History:   Diagnosis Date    Abscess of breast associated with lactation 12/08/2017    Acute mastitis of left breast 12/08/2017    GERD (gastroesophageal reflux disease) 2023    History of Ovarian cyst 2023    right and left    History of postpartum depression 2020    vaginal delivery    History of Vaginal delivery     x2 - with laceration    Hx of migraines 2023    Post partum depression 02/12/2021    Retinal tear 04/21/2020      Past Surgical History History reviewed. No pertinent surgical history.   Family History: History reviewed. No pertinent family history.   Social History:  reports that she has never smoked. She has never used smokeless tobacco.   reports no history of alcohol use.   reports no history of drug use.                   General ROS Negative Findings:Visual Changes, Epigastric pain, Anorexia, ROM, and Vaginal Bleeding    ROS     All other systems have been reviewed and are neg  Objective       Vital Signs Range for the last 24 hours  Temperature: Temp:  [97.8 °F (36.6 °C)] 97.8 °F (36.6 °C)   Temp Source: Temp src: Oral   BP: BP: (129)/(81) 129/81   Pulse:     Respirations: Resp:  [16] 16   SPO2:     O2 Amount (l/min):     O2 Devices     Weight: Weight:  [97.5 kg (215 lb)] 97.5 kg (215 lb)     Physical Examination:   General:   alert, appears stated age, and cooperative   Skin:   normal   HEENT:     Lungs:   clear to auscultation bilaterally   Heart:   regular rate and rhythm, S1, S2 normal, no murmur, click, rub or gallop   Gastrointestinal: Abdomen soft, gravid uterus, guarding benign exam   Lower Extremities: No edema, no calf tenderness   :    Musculoskeletal:     Neuro: No focal deficits noted               Fetal Heart Rate Assessment   Method:     Beats/min:     Baseline:     Varibility:     Accels:      Decels:     Tracing Category:     C-indications headache, interpretation reactive, moderate bili, accelerations present 10 x 10, no fetal deceleration noted, onset 1056, end time 1656 no regular contractions noted.  Uterine Assessment   Method: Method: per patient report   Frequency (min):     Ctx Count in 10 min:     Duration:     Intensity: Contraction Intensity: no contractions (per pt report)   Intensity by IUPC:     Resting Tone:     Resting Tone by IUPC:     [place] Units:       Laboratory Results:   Lab Results (last 24 hours)       Procedure Component Value Units Date/Time    Urinalysis With Microscopic If Indicated (No Culture) - Urine, Clean Catch [[id number]] Collected: 03/12/24 1649    Specimen: Urine, Clean Catch Updated: 03/12/24 1702    CBC & Differential [[id number]]  (Abnormal) Collected: 03/12/24 1649    Specimen: Blood Updated: 03/12/24 1657    Narrative:      The following orders were created for panel order CBC & Differential.  Procedure                               Abnormality         Status                     ---------                               -----------         ------                     CBC Auto Differential[[id number]]        Abnormal            Final result                 Please view results for these tests on the individual orders.    CBC Auto Differential [[id number]]  (Abnormal) Collected: 03/12/24 1649    Specimen: Blood Updated: 03/12/24 1657     WBC 10.67 10*3/mm3      RBC 3.58 10*6/mm3      Hemoglobin 10.4 g/dL      Hematocrit 31.9 %      MCV 89.1 fL      MCH 29.1 pg      MCHC 32.6 g/dL      RDW 13.2 %      RDW-SD 43.2 fl      MPV 9.2 fL      Platelets 245 10*3/mm3      Neutrophil % 80.3 %      Lymphocyte % 11.3 %      Monocyte % 7.4 %      Eosinophil % 0.2 %      Basophil % 0.2 %      Immature Grans % 0.6 %      Neutrophils, Absolute 8.57 10*3/mm3      Lymphocytes, Absolute 1.21 10*3/mm3      Monocytes, Absolute 0.79 10*3/mm3      Eosinophils, Absolute 0.02 10*3/mm3       Basophils, Absolute 0.02 10*3/mm3      Immature Grans, Absolute 0.06 10*3/mm3      nRBC 0.0 /100 WBC     Comprehensive Metabolic Panel [[id number]] Collected: 03/12/24 1649    Specimen: Blood Updated: 03/12/24 1654          Radiology Review:   Imaging Results (Last 24 Hours)       ** No results found for the last 24 hours. **          Other Studies:    Assessment & Plan       HA (headache)        Assessment:  1.  Intrauterine pregnancy at 25w6d weeks gestation with reactive fetal status.    2.  Headache possible migrainous type  3.  Currently treated by ENT for acute maxillary sinusitis  4.  History of influenza and pneumonia last month.    Plan:  1.  Observation, labs, MRI without contrast, MRV rule out thrombosis, initial treatment for migraine Benadryl/Reglan.  2. Plan of care has been reviewed with patient.  3.  Risks, benefits of treatment plan have been discussed.  4.  All questions have been answered.  5      Amarjit Glover DO  3/12/2024  17:07 EDT

## 2024-03-12 NOTE — TELEPHONE ENCOUNTER
"Ms Call called [facility] office.  Reports onset of left sided pain in her head 3 days ago.  Describes the pain as throbbing and unrelieved by tylenol rest and fluids.  She went to a local ENT in [facility] that \"did not know what to do for her\".  She just finished a steroid dose pack and has been on antibiotics for 3 weeks.    She recently was hospitalized here for flu b and Pneumonia and a uti.    Reports that she is in a lot of pain and worried about what might be going on with her and /or the baby.  This RN spoke with Dr Myles who advised she will need to go to local ER or come to L & D to be evaluated for treatment plan.    Pt further states \"no one will do any scans of her head and I am worried it could be preeclampsia\".    Pt advised of wards recommendations, states she will come here to [facility] with arrival at approx. 2:30 pm   L & d and laborist advised of pt's arrival.    "

## 2024-03-20 ENCOUNTER — TELEPHONE (OUTPATIENT)
Dept: OBSTETRICS AND GYNECOLOGY | Facility: HOSPITAL | Age: 29
End: 2024-03-20
Payer: COMMERCIAL

## 2024-03-20 NOTE — TELEPHONE ENCOUNTER
Pt called with complaints of N/V x 5 and unable to keep fluids down for the past 15 hours. Pt lives 2 hours away from [place]. Pt advised to go to local ED for IV hydration. Pt verbalized understanding and said she would most likely go to [facility].

## 2024-03-26 ENCOUNTER — TELEPHONE (OUTPATIENT)
Dept: OBSTETRICS AND GYNECOLOGY | Facility: HOSPITAL | Age: 29
End: 2024-03-26
Payer: COMMERCIAL

## 2024-03-26 NOTE — TELEPHONE ENCOUNTER
Pt called, states she missed last appt here, asking if she needs to do glucola at tomorrow's appt. Pt asked if she needs to be NPO- advised pt glucola can be done tomorrow, and she can eat a light breakfast, but to avoid sugary foods or drinks. Pt voiced understanding.

## 2024-03-27 ENCOUNTER — ROUTINE PRENATAL (OUTPATIENT)
Dept: OBSTETRICS AND GYNECOLOGY | Facility: HOSPITAL | Age: 29
End: 2024-03-27
Payer: COMMERCIAL

## 2024-03-27 ENCOUNTER — LAB (OUTPATIENT)
Dept: LAB | Facility: HOSPITAL | Age: 29
End: 2024-03-27
Payer: COMMERCIAL

## 2024-03-27 ENCOUNTER — HOSPITAL ENCOUNTER (OUTPATIENT)
Dept: WOMENS IMAGING | Facility: HOSPITAL | Age: 29
Discharge: HOME | End: 2024-03-27
Payer: COMMERCIAL

## 2024-03-27 VITALS — WEIGHT: 221.6 LBS | SYSTOLIC BLOOD PRESSURE: 109 MMHG | BODY MASS INDEX: 32.72 KG/M2 | DIASTOLIC BLOOD PRESSURE: 62 MMHG

## 2024-03-27 DIAGNOSIS — Z3A.28 28 WEEKS GESTATION OF PREGNANCY: Primary | ICD-10-CM

## 2024-03-27 DIAGNOSIS — Z3A.20 20 WEEKS GESTATION OF PREGNANCY: ICD-10-CM

## 2024-03-27 DIAGNOSIS — Z34.90 PREGNANCY, UNSPECIFIED GESTATIONAL AGE: ICD-10-CM

## 2024-03-27 LAB
BILIRUB BLD-MCNC: NEGATIVE MG/DL
CLARITY, POC: CLEAR
COLOR UR: YELLOW
DEPRECATED RDW RBC AUTO: 39.8 FL (ref 37–54)
ERYTHROCYTE [DISTWIDTH] IN BLOOD BY AUTOMATED COUNT: 13 % (ref 12.3–15.4)
GLUCOSE 1H P 100 G GLC PO SERPL-MCNC: 129 MG/DL (ref 65–139)
GLUCOSE UR STRIP-MCNC: NEGATIVE MG/DL
HCT VFR BLD AUTO: 30 % (ref 34–46.6)
HGB BLD-MCNC: 9.7 G/DL (ref 12–15.9)
KETONES UR QL: NEGATIVE
LEUKOCYTE EST, POC: NEGATIVE
MCH RBC QN AUTO: 27.6 PG (ref 26.6–33)
MCHC RBC AUTO-ENTMCNC: 32.3 G/DL (ref 31.5–35.7)
MCV RBC AUTO: 85.5 FL (ref 79–97)
NITRITE UR-MCNC: NEGATIVE MG/ML
PH UR: 6 [PH] (ref 5–8)
PLATELET # BLD AUTO: 200 10*3/MM3 (ref 140–450)
PMV BLD AUTO: 9.7 FL (ref 6–12)
PROT UR STRIP-MCNC: NEGATIVE MG/DL
RBC # BLD AUTO: 3.51 10*6/MM3 (ref 3.77–5.28)
RBC # UR STRIP: NEGATIVE /UL
SP GR UR: 1 (ref 1–1.03)
T PALLIDUM IGG SER QL: NORMAL
UROBILINOGEN UR QL: NORMAL
WBC NRBC COR # BLD AUTO: 7.45 10*3/MM3 (ref 3.4–10.8)

## 2024-03-27 PROCEDURE — 82948 REAGENT STRIP/BLOOD GLUCOSE: CPT

## 2024-03-27 PROCEDURE — 76816 OB US FOLLOW-UP PER FETUS: CPT

## 2024-03-27 PROCEDURE — 36415 COLL VENOUS BLD VENIPUNCTURE: CPT

## 2024-03-27 PROCEDURE — 85027 COMPLETE CBC AUTOMATED: CPT

## 2024-03-27 PROCEDURE — 82950 GLUCOSE TEST: CPT

## 2024-03-27 PROCEDURE — 86780 TREPONEMA PALLIDUM: CPT

## 2024-03-27 NOTE — PROGRESS NOTES
Pt reports recent diagnosis of acute chronic sinuitis, may need surgery to clean it out after baby is born, pt reports ha and sinus pressure is improving  Next f/u with [facility] to be scheduled  NIPT neg, AFP neg

## 2024-04-09 DIAGNOSIS — K59.04 CHRONIC IDIOPATHIC CONSTIPATION: ICD-10-CM

## 2024-04-09 DIAGNOSIS — Z3A.28 28 WEEKS GESTATION OF PREGNANCY: Primary | ICD-10-CM

## 2024-04-09 DIAGNOSIS — G43.809 OTHER MIGRAINE WITHOUT STATUS MIGRAINOSUS, NOT INTRACTABLE: ICD-10-CM

## 2024-04-10 ENCOUNTER — ROUTINE PRENATAL (OUTPATIENT)
Dept: OBSTETRICS AND GYNECOLOGY | Facility: HOSPITAL | Age: 29
End: 2024-04-10
Payer: COMMERCIAL

## 2024-04-10 VITALS — DIASTOLIC BLOOD PRESSURE: 57 MMHG | WEIGHT: 222 LBS | SYSTOLIC BLOOD PRESSURE: 109 MMHG | BODY MASS INDEX: 32.78 KG/M2

## 2024-04-10 DIAGNOSIS — J32.0 CHRONIC MAXILLARY SINUSITIS: ICD-10-CM

## 2024-04-10 DIAGNOSIS — O26.849 UTERINE SIZE DATE DISCREPANCY PREGNANCY: ICD-10-CM

## 2024-04-10 DIAGNOSIS — K59.04 CHRONIC IDIOPATHIC CONSTIPATION: ICD-10-CM

## 2024-04-10 DIAGNOSIS — K76.0 FATTY LIVER IN MOTHER DURING PREGNANCY: Primary | Chronic | ICD-10-CM

## 2024-04-10 DIAGNOSIS — Z3A.30 30 WEEKS GESTATION OF PREGNANCY: ICD-10-CM

## 2024-04-10 DIAGNOSIS — O26.619 FATTY LIVER IN MOTHER DURING PREGNANCY: Primary | Chronic | ICD-10-CM

## 2024-04-10 PROBLEM — J32.1 CHRONIC FRONTAL SINUSITIS: Status: ACTIVE | Noted: 2024-04-10

## 2024-04-10 LAB
BILIRUB BLD-MCNC: NEGATIVE MG/DL
CLARITY, POC: CLEAR
COLOR UR: YELLOW
GLUCOSE UR STRIP-MCNC: NEGATIVE MG/DL
KETONES UR QL: NEGATIVE
LEUKOCYTE EST, POC: NEGATIVE
NITRITE UR-MCNC: NEGATIVE MG/ML
PH UR: 7.5 [PH] (ref 5–8)
PROT UR STRIP-MCNC: ABNORMAL MG/DL
RBC # UR STRIP: NEGATIVE /UL
SP GR UR: 1 (ref 1–1.03)
UROBILINOGEN UR QL: ABNORMAL

## 2024-04-10 NOTE — PROGRESS NOTES
Patient's next appointment here with ultrasound is on 4/24/24. NIPT and AFP negative. Patient denies contractions, vaginal bleeding, and loss of fluid.

## 2024-04-10 NOTE — ASSESSMENT & PLAN NOTE
Pt s/p GCT at last visit that was 129mg/dl which is barely passing.     Asked patient to check her blood sugars 4x/day for a few days and then send the results in on Monday.

## 2024-04-10 NOTE — ASSESSMENT & PLAN NOTE
Still struggling with this. Had an episode this week after her  mowed the lawn.     Reviewed ok to use nasal steroids and allergy meds.

## 2024-04-10 NOTE — PROGRESS NOTES
Follow-Up Prenatal Visit     Patient Name: Marilynn Call  : 1995   MRN: [id number]     Subjective     Marilynn Call is a 28 y.o.  at 30w0d who is here today for follow-up prenatal visit. Overall doing better. Did have an episode of headache likely secondary to allergies. Starting the have the upper esophageal fullness feeling she has had in prior pregnancies. Pepcid/Prilosec never worked in the past. She says can tolerate it.     Denies LOF/VB/ctx's. +FM.      Objective     Vitals:    04/10/24 1422   BP: 109/57   Weight: 101 kg (222 lb)     Body mass index is 32.78 kg/m².    Physical Exam  Constitutional:       Appearance: Normal appearance. She is normal weight.   HENT:      Head: Normocephalic and atraumatic.   Pulmonary:      Effort: Pulmonary effort is normal.   Musculoskeletal:         General: Normal range of motion.   Neurological:      General: No focal deficit present.      Mental Status: She is alert and oriented to person, place, and time.   Psychiatric:         Mood and Affect: Mood normal.         Behavior: Behavior normal.         Thought Content: Thought content normal.         Judgment: Judgment normal.     FHR -- 140's    Lab Results   Component Value Date    CREATININE 0.53 (L) 2024    AST 13 2024    ALT 9 2024    HCT 30.0 (L) 2024    HGB 9.7 (L) 2024     2024    TSH 1.090 2024     Assessment / Plan      Assessment/Plan:   Diagnoses and all orders for this visit:    1. Fatty liver in mother during pregnancy (Primary)  -     POC Urinalysis Dipstick    2. Chronic idiopathic constipation    3. Chronic maxillary sinusitis  Assessment & Plan:  Still struggling with this. Had an episode this week after her  mowed the lawn.     Reviewed ok to use nasal steroids and allergy meds.       4. Uterine size date discrepancy pregnancy  Assessment & Plan:  Pt s/p GCT at last visit that was 129mg/dl which is barely passing.      Asked patient to check her blood sugars 4x/day for a few days and then send the results in on Monday.       5. 30 weeks gestation of pregnancy          Follow Up:   Return in about 2 weeks (around 4/24/2024).    I spent 20 minutes caring for Marilynn on this date of service. This time includes time spent by me in the following activities: preparing for the visit, reviewing tests, obtaining and/or reviewing a separately obtained history, counseling and educating the patient/family/caregiver, ordering medications, tests, or procedures, and documenting information in the medical record    Ny Gutierrez MD

## 2024-04-16 PROBLEM — Z3A.20 20 WEEKS GESTATION OF PREGNANCY: Status: ACTIVE | Noted: 2024-04-16

## 2024-04-24 ENCOUNTER — HOSPITAL ENCOUNTER (OUTPATIENT)
Dept: WOMENS IMAGING | Facility: HOSPITAL | Age: 29
Discharge: HOME | End: 2024-04-24
Admitting: OBSTETRICS & GYNECOLOGY
Payer: COMMERCIAL

## 2024-04-24 ENCOUNTER — ROUTINE PRENATAL (OUTPATIENT)
Dept: OBSTETRICS AND GYNECOLOGY | Facility: HOSPITAL | Age: 29
End: 2024-04-24
Payer: COMMERCIAL

## 2024-04-24 VITALS — DIASTOLIC BLOOD PRESSURE: 63 MMHG | SYSTOLIC BLOOD PRESSURE: 109 MMHG | WEIGHT: 225 LBS | BODY MASS INDEX: 33.23 KG/M2

## 2024-04-24 DIAGNOSIS — Z3A.32 32 WEEKS GESTATION OF PREGNANCY: ICD-10-CM

## 2024-04-24 DIAGNOSIS — K59.04 CHRONIC IDIOPATHIC CONSTIPATION: ICD-10-CM

## 2024-04-24 DIAGNOSIS — K76.0 FATTY LIVER IN MOTHER DURING PREGNANCY: Chronic | ICD-10-CM

## 2024-04-24 DIAGNOSIS — O26.619 FATTY LIVER IN MOTHER DURING PREGNANCY: Chronic | ICD-10-CM

## 2024-04-24 DIAGNOSIS — J32.0 CHRONIC MAXILLARY SINUSITIS: ICD-10-CM

## 2024-04-24 DIAGNOSIS — O26.849 UTERINE SIZE DATE DISCREPANCY PREGNANCY: Primary | ICD-10-CM

## 2024-04-24 DIAGNOSIS — G43.809 OTHER MIGRAINE WITHOUT STATUS MIGRAINOSUS, NOT INTRACTABLE: ICD-10-CM

## 2024-04-24 DIAGNOSIS — Z3A.28 28 WEEKS GESTATION OF PREGNANCY: ICD-10-CM

## 2024-04-24 LAB
BILIRUB BLD-MCNC: NEGATIVE MG/DL
CLARITY, POC: CLEAR
COLOR UR: YELLOW
GLUCOSE UR STRIP-MCNC: NEGATIVE MG/DL
KETONES UR QL: NEGATIVE
LEUKOCYTE EST, POC: NEGATIVE
NITRITE UR-MCNC: NEGATIVE MG/ML
PH UR: 6 [PH] (ref 5–8)
PROT UR STRIP-MCNC: NEGATIVE MG/DL
RBC # UR STRIP: NEGATIVE /UL
SP GR UR: 1.01 (ref 1–1.03)
UROBILINOGEN UR QL: NORMAL

## 2024-04-24 PROCEDURE — 76816 OB US FOLLOW-UP PER FETUS: CPT

## 2024-04-24 PROCEDURE — 76819 FETAL BIOPHYS PROFIL W/O NST: CPT

## 2024-04-24 RX ORDER — ONDANSETRON 8 MG/1
TABLET, ORALLY DISINTEGRATING ORAL
COMMUNITY
Start: 2023-12-15

## 2024-04-24 NOTE — PROGRESS NOTES
Patient presents for OB follow-up appointment. NIPT and AFP negative. Patient denies contractions, vaginal bleeding, and loss of fluid.

## 2024-05-01 ENCOUNTER — MEDICATION THERAPY MANAGEMENT (OUTPATIENT)
Dept: OBSTETRICS AND GYNECOLOGY | Facility: HOSPITAL | Age: 29
End: 2024-05-01
Payer: COMMERCIAL

## 2024-05-01 DIAGNOSIS — O24.414 INSULIN CONTROLLED GESTATIONAL DIABETES MELLITUS (GDM) IN THIRD TRIMESTER: Primary | ICD-10-CM

## 2024-05-01 DIAGNOSIS — O26.849 UTERINE SIZE DATE DISCREPANCY PREGNANCY: Primary | ICD-10-CM

## 2024-05-01 DIAGNOSIS — K59.04 CHRONIC IDIOPATHIC CONSTIPATION: ICD-10-CM

## 2024-05-01 DIAGNOSIS — O26.619 FATTY LIVER IN MOTHER DURING PREGNANCY: ICD-10-CM

## 2024-05-01 DIAGNOSIS — K76.0 FATTY LIVER IN MOTHER DURING PREGNANCY: ICD-10-CM

## 2024-05-01 DIAGNOSIS — J32.0 CHRONIC MAXILLARY SINUSITIS: ICD-10-CM

## 2024-05-01 RX ORDER — PEN NEEDLE, DIABETIC 30 GX3/16"
1 NEEDLE, DISPOSABLE MISCELLANEOUS 4 TIMES DAILY
Qty: 100 EACH | Refills: 2 | Status: SHIPPED | OUTPATIENT
Start: 2024-05-01

## 2024-05-01 RX ORDER — ACYCLOVIR 400 MG/1
1 TABLET ORAL
Qty: 3 EACH | Refills: 3 | Status: SHIPPED | OUTPATIENT
Start: 2024-05-01

## 2024-05-01 RX ORDER — ACYCLOVIR 400 MG/1
1 TABLET ORAL CONTINUOUS
Qty: 1 EACH | Refills: 0 | Status: SHIPPED | OUTPATIENT
Start: 2024-05-01

## 2024-05-01 NOTE — ASSESSMENT & PLAN NOTE
I am concerned for late onset GDM. Recommended to patient that she either start Metformin or low dose insulin. She would like to try to work on her diet for one week first.      - We will contact her to get a log of blood sugars next week   - If persistently elevated will prescribe Lantus 10u qhs and a CGM   - Will follow blood sugars weekly   - Repeat ultrasound for growth at 36wks

## 2024-05-01 NOTE — PROGRESS NOTES
Follow-Up Prenatal Visit     Patient Name: Marilynn Call  : 1995   MRN: [id number]     Subjective     Marilynn Call is a 28 y.o.  at 32w0d who is here today for follow-up prenatal visit. She has been checking her blood sugars intermittently. She reports her fastings are 96-118mg/dl and her post-prandials are 150's with a single 218. She admits she has not changed her diet at all.     She denies LOF/VB/ctx's. +FM.      Objective     Vitals:    24 1242   BP: 109/63   Weight: 102 kg (225 lb)     Body mass index is 33.23 kg/m².    Physical Exam  Constitutional:       Appearance: Normal appearance. She is normal weight.   HENT:      Head: Normocephalic and atraumatic.   Pulmonary:      Effort: Pulmonary effort is normal.   Musculoskeletal:         General: Normal range of motion.   Neurological:      General: No focal deficit present.      Mental Status: She is alert and oriented to person, place, and time.   Psychiatric:         Mood and Affect: Mood normal.         Behavior: Behavior normal.         Thought Content: Thought content normal.         Judgment: Judgment normal.         Lab Results   Component Value Date    CREATININE 0.53 (L) 2024    AST 13 2024    ALT 9 2024    HCT 30.0 (L) 2024    HGB 9.7 (L) 2024     2024    TSH 1.090 2024     Procedures  3/27/2024: EFW 84th% 1532gms, AC 93rd%, nl URIAH, anterior placenta, nl anatomy, nl Dopplers.  Growth/BPP today: Vtx, EFW 66th% 2158gms, AC 88th%, nl URIAH, anterior placenta, nl anatomy, BPP 8/8, nl UA Dopplers.     Assessment / Plan      Assessment/Plan:   Diagnoses and all orders for this visit:    1. Uterine size date discrepancy pregnancy (Primary)  Assessment & Plan:  I am concerned for late onset GDM. Recommended to patient that she either start Metformin or low dose insulin. She would like to try to work on her diet for one week first.      - We will contact her to get a log of blood  sugars next week   - If persistently elevated will prescribe Lantus 10u qhs and a CGM   - Will follow blood sugars weekly   - Repeat ultrasound for growth at 36wks    Orders:  -     POC Urinalysis Dipstick    2. Chronic idiopathic constipation  Assessment & Plan:  Remains an issue. Patient is used to dealing with it.       3. Chronic maxillary sinusitis  Assessment & Plan:  Plan is for reevaluation after delivery and possible sinus surgery.       4. Fatty liver in mother during pregnancy    5. 32 weeks gestation of pregnancy        Follow Up:   Return in about 2 weeks (around 5/8/2024).    I spent 30 minutes caring for Marilynn on this date of service. This time includes time spent by me in the following activities: preparing for the visit, reviewing tests, obtaining and/or reviewing a separately obtained history, counseling and educating the patient/family/caregiver, ordering medications, tests, or procedures, and documenting information in the medical record    Ny Gutierrez MD

## 2024-05-02 ENCOUNTER — TELEPHONE (OUTPATIENT)
Dept: OBSTETRICS AND GYNECOLOGY | Facility: HOSPITAL | Age: 29
End: 2024-05-02
Payer: COMMERCIAL

## 2024-05-02 NOTE — TELEPHONE ENCOUNTER
Attempted to reach patient by phone, received voice mail.  Message left informing patient that her Dexcom has been approved and pharamcy is filling.  Also informed a detailed [employer] message has been sent with instruction regarding apps to download.  Messages is as follows:      Your Dexcom has been approved.  I spoke with Morelia with [facility] pharmacy and they are getting them ready for you.  You will have 3 sensors and one .  Just follow the directions with the sensors.    Should you have a sensor that fails, do not remove until you have spoken with Dexcom Technical support.  You can reach them at [phone]  They will need the 4 number code on the applicator that you applied the sensor with.  They will send you a new one.  It is important to know that the pharmace or the insurance company will NOT replace these sensors.     Once you have your Dexcom, you will need to download 2 apps on your phone.  One is the Dexcom bree for the G7.  This will need to stay open in the background on your phone at all times so data can be obtained.  The other bree is the dexcom Clarity bree.  Once you have that bree you need to go to profile then to Target Range    in Target Range set your low for 65 and your high for 140.  Go back to beginning of profile  Click on Manage Data Sharing  Below the blue bar that say continue is a very small sentence in blue that says To generate a temporary code, tap here.  Click on that sentence.  It will give you a choice of 3 5 or 12 months to share data.  Click on 6 the the blue bar that says generate Code.  It will give you a 12 letter code.  Send me that code through [employer] and I will be able to run your blood sugar report that way instead of you sending in weekly.  You will still need the use of the glucometer at times.  If you have low readings, (which sometimes happens if you lay on the sensor) or high readings, you will need to do a fingerstick to see if it is off.  This does  sometimes happen when you first apply a new sensor.  If it is more than  20 off enter as a calibration.  You will do this in the Dexcom bree.  There is a + sign in the top right corner.  Click on the + then blood glucose.  Click on the use as calibration and enter in your fingerstick result.  This will make the dexcom more accurate.  You will also notice that below the blood glucose you can enter in carbs for your meals, activity etc.  This will all show up on the report we run.       If you have any questions please feel free to reach out to us at any time.   Shruthi Parikh RN

## 2024-05-02 NOTE — TELEPHONE ENCOUNTER
Spoke with Morelia with [facility] in [place].  Informed Morelia that prior authorization had been submitted for patients Dexcom Sensors and receivers through [employer] and the determination stated that a PA was not required at this time.  Morelia ran both and they went through.  Morelia states she will fill them for patient.  Will contact patient to let her know.  Shruthi Parikh RN

## 2024-05-09 ENCOUNTER — TELEPHONE (OUTPATIENT)
Dept: OBSTETRICS AND GYNECOLOGY | Facility: HOSPITAL | Age: 29
End: 2024-05-09
Payer: COMMERCIAL

## 2024-05-15 ENCOUNTER — TELEPHONE (OUTPATIENT)
Dept: OBSTETRICS AND GYNECOLOGY | Facility: HOSPITAL | Age: 29
End: 2024-05-15
Payer: COMMERCIAL

## 2024-05-15 NOTE — TELEPHONE ENCOUNTER
"Spoke with patient over the phone.  Informed patient that Dr. Gutierrez has reviewed her blood sugar log and wants her to change her target range in the clarity bree to   patient did that while on phone.  She also statles \"still a little high during the day.  Keep working on food choices. Avoid carbs, add veggies and fiber.\"  Patient voices understanding.  Shruthi Parikh RN  "

## 2024-05-21 ENCOUNTER — TELEPHONE (OUTPATIENT)
Dept: OBSTETRICS AND GYNECOLOGY | Facility: HOSPITAL | Age: 29
End: 2024-05-21
Payer: COMMERCIAL

## 2024-05-21 NOTE — TELEPHONE ENCOUNTER
Attempted to reach patient by phone.  Message left stating no data from Dexcom since 5/15/24.  Asking if patient is having issues with Dexcom and to please call [phone].  Also requested patient send in a paper blood sugar log in anticipation of her appointment here tomorrow.  Shruthi Parikh RN

## 2024-05-22 ENCOUNTER — LAB (OUTPATIENT)
Dept: LAB | Facility: HOSPITAL | Age: 29
End: 2024-05-22
Payer: COMMERCIAL

## 2024-05-22 ENCOUNTER — ROUTINE PRENATAL (OUTPATIENT)
Dept: OBSTETRICS AND GYNECOLOGY | Facility: HOSPITAL | Age: 29
End: 2024-05-22
Payer: COMMERCIAL

## 2024-05-22 ENCOUNTER — HOSPITAL ENCOUNTER (OUTPATIENT)
Dept: WOMENS IMAGING | Facility: HOSPITAL | Age: 29
Discharge: HOME | End: 2024-05-22
Payer: COMMERCIAL

## 2024-05-22 VITALS — DIASTOLIC BLOOD PRESSURE: 57 MMHG | SYSTOLIC BLOOD PRESSURE: 120 MMHG | WEIGHT: 233 LBS | BODY MASS INDEX: 34.41 KG/M2

## 2024-05-22 DIAGNOSIS — K59.04 CHRONIC IDIOPATHIC CONSTIPATION: ICD-10-CM

## 2024-05-22 DIAGNOSIS — K76.0 FATTY LIVER IN MOTHER DURING PREGNANCY: ICD-10-CM

## 2024-05-22 DIAGNOSIS — O26.619 FATTY LIVER IN MOTHER DURING PREGNANCY: ICD-10-CM

## 2024-05-22 DIAGNOSIS — Z3A.36 36 WEEKS GESTATION OF PREGNANCY: Primary | ICD-10-CM

## 2024-05-22 DIAGNOSIS — K21.00 GASTROESOPHAGEAL REFLUX DISEASE WITH ESOPHAGITIS WITHOUT HEMORRHAGE: ICD-10-CM

## 2024-05-22 DIAGNOSIS — O26.849 UTERINE SIZE DATE DISCREPANCY PREGNANCY: ICD-10-CM

## 2024-05-22 DIAGNOSIS — K76.0 FATTY LIVER IN MOTHER DURING PREGNANCY: Chronic | ICD-10-CM

## 2024-05-22 DIAGNOSIS — J32.0 CHRONIC MAXILLARY SINUSITIS: ICD-10-CM

## 2024-05-22 DIAGNOSIS — O26.619 FATTY LIVER IN MOTHER DURING PREGNANCY: Chronic | ICD-10-CM

## 2024-05-22 DIAGNOSIS — O24.410 DIET CONTROLLED GESTATIONAL DIABETES MELLITUS (GDM) IN THIRD TRIMESTER: ICD-10-CM

## 2024-05-22 LAB
BILIRUB BLD-MCNC: NEGATIVE MG/DL
CLARITY, POC: CLEAR
COLOR UR: YELLOW
GLUCOSE UR STRIP-MCNC: NEGATIVE MG/DL
KETONES UR QL: NEGATIVE
LEUKOCYTE EST, POC: NEGATIVE
NITRITE UR-MCNC: NEGATIVE MG/ML
PH UR: 6 [PH] (ref 5–8)
PROT UR STRIP-MCNC: ABNORMAL MG/DL
RBC # UR STRIP: NEGATIVE /UL
SP GR UR: 1.01 (ref 1–1.03)
UROBILINOGEN UR QL: NORMAL

## 2024-05-22 PROCEDURE — 76818 FETAL BIOPHYS PROFILE W/NST: CPT

## 2024-05-22 PROCEDURE — 76819 FETAL BIOPHYS PROFIL W/O NST: CPT

## 2024-05-22 PROCEDURE — 87081 CULTURE SCREEN ONLY: CPT | Performed by: OBSTETRICS & GYNECOLOGY

## 2024-05-22 PROCEDURE — 76816 OB US FOLLOW-UP PER FETUS: CPT

## 2024-05-22 NOTE — PROGRESS NOTES
Patient presents for f/u visit. NIPT and AFP negative. States didn't feel baby move for 6 hours a few days ago, drank 3 bottles of water and movement resumed.

## 2024-05-22 NOTE — ASSESSMENT & PLAN NOTE
No current symptoms/complaints. Plan is still to reevaluate after delivery for possible sinus surgery.

## 2024-05-22 NOTE — ASSESSMENT & PLAN NOTE
Patient passed her 1-hr GCT with a 129mg/dl. Then at her 32wk ultrasound an LGA fetus was noted and patient was asked to start checking blood glucoses. Patient had both elevated fasting blood sugars and elevated post-prandial blood sugars consistently. It was recommended that patient start taking a low dose insulin, but she declined and preferred to try to alter her diet. She is not currently wearing her Dexcom, but reports that her fastings are closer to 90mg/dl and her post-meal values are now mostly in 120's.      - Recommend patient continue to wear a Dexcom so we can have data to review   - Discussed the importance of tight blood sugar control in the last weeks prior to delivery as this impacts how baby does after delivery   - Due to lack of control of GDM and lack of data, recommend delivery at 38wks   - Patient scheduled for NST's until delivery

## 2024-05-22 NOTE — PROGRESS NOTES
Follow-Up Prenatal Visit     Patient Name: Marilynn Call  : 1995   MRN: [id number]     Subjective     Marilynn Call is a 29 y.o.  at 36w0d who is here today for follow-up prenatal visit.     Has not been feeling great last several days. Not entirely clear if due to constipation or having contractions. Some intermittent abdominal pain the other night and into the next day, but slightly better since then. Was feeling intermittent back and lower abdominal pain at the time. Also with significant pressure. No LOF/VB. Slight decrease in FM over the last weekend, but was able to feel her move when she concentrated on it and drank cold water.      Objective     Vitals:    24 0824   BP: 120/57   Weight: 106 kg (233 lb)     Body mass index is 34.41 kg/m².    Physical Exam  Constitutional:       Appearance: Normal appearance. She is normal weight.   HENT:      Head: Normocephalic and atraumatic.   Pulmonary:      Effort: Pulmonary effort is normal.   Musculoskeletal:         General: Normal range of motion.   Neurological:      General: No focal deficit present.      Mental Status: She is alert and oriented to person, place, and time.   Psychiatric:         Mood and Affect: Mood normal.         Behavior: Behavior normal.         Thought Content: Thought content normal.         Judgment: Judgment normal.     SVE -- 3/50/soft/mid-position/-2  GBS done today    NST -- (Start time 907)  Indication: GDM, decreased FM  Interpretation: baseline 140's, moderate variability, ++accels, -decels    Lab Results   Component Value Date    CREATININE 0.53 (L) 2024    AST 13 2024    ALT 9 2024    HCT 30.0 (L) 2024    HGB 9.7 (L) 2024     2024    TSH 1.090 2024     Procedures  Ultrasound today -- Vtx, anterior placenta, nl URIAH, EFW 82nd% 3319gms, AC 97th%, BPP 10/10, UA Dopplers wnl.  Assessment / Plan      Assessment/Plan:   Diagnoses and all orders for  this visit:    1. 36 weeks gestation of pregnancy (Primary)  -     POC Urinalysis Dipstick  -     Group B Streptococcus Culture - Swab, Vaginal/Rectum    2. Chronic idiopathic constipation  Assessment & Plan:  Remains an issue. Patient is used to dealing with it.       3. Chronic maxillary sinusitis  Assessment & Plan:  No current symptoms/complaints. Plan is still to reevaluate after delivery for possible sinus surgery.      4. Diet controlled gestational diabetes mellitus (GDM) in third trimester  Assessment & Plan:  Patient passed her 1-hr GCT with a 129mg/dl. Then at her 32wk ultrasound an LGA fetus was noted and patient was asked to start checking blood glucoses. Patient had both elevated fasting blood sugars and elevated post-prandial blood sugars consistently. It was recommended that patient start taking a low dose insulin, but she declined and preferred to try to alter her diet. She is not currently wearing her Dexcom, but reports that her fastings are closer to 90mg/dl and her post-meal values are now mostly in 120's.      - Recommend patient continue to wear a Dexcom so we can have data to review   - Discussed the importance of tight blood sugar control in the last weeks prior to delivery as this impacts how baby does after delivery   - Due to lack of control of GDM and lack of data, recommend delivery at 38wks   - Patient scheduled for NST's until delivery      5. Fatty liver in mother during pregnancy    6. Gastroesophageal reflux disease with esophagitis without hemorrhage    7. Uterine size date discrepancy pregnancy  Assessment & Plan:  Patient with LGA baby.           Follow Up:   Return in about 1 week (around 5/29/2024).    I spent 30 minutes caring for Marilynn on this date of service. This time includes time spent by me in the following activities: preparing for the visit, reviewing tests, obtaining and/or reviewing a separately obtained history, performing a medically appropriate examination  and/or evaluation , counseling and educating the patient/family/caregiver, ordering medications, tests, or procedures, documenting information in the medical record, and independently interpreting results and communicating that information with the patient/family/caregiver    Ny Gutierrez MD

## 2024-05-24 ENCOUNTER — TELEPHONE (OUTPATIENT)
Dept: OBSTETRICS AND GYNECOLOGY | Facility: HOSPITAL | Age: 29
End: 2024-05-24
Payer: COMMERCIAL

## 2024-05-24 NOTE — TELEPHONE ENCOUNTER
Spoke to Ms. Oneyda,   Informed Dr Cee of BP's 135/76, 108/83, and 125/67 .  Marilynn advised to come to triage to get evaluated  for decreased FM , mild epigastric pain , and preeclampsia labs. Pt wants to wait 1-2 hours and rest on her side to see if FM improves.  Pt advised to come to triage again for evaluation, due to symptoms.  Advised patient that she can go to local hospital if desires due to 2 hour drive here.   Pt with adam

## 2024-05-24 NOTE — TELEPHONE ENCOUNTER
"Spoke with Ms Call,  Denies Lock, blurry vision, contractions, leaking of fluid and vaginal bleeding  Reports decreased FM over the last week, but was in to [facility] on Wednesday 5/22 for a f/u visit and had an NST that was reactive.   She does report some \"mild\" intermittent upper abdominal pain over the last few days.  Pt reports she has a BP cuff advised pt to take bp now - instructed on how to take an accurate bp.  Will call pt back in approx 10-15 minutes to see what her bp is   "

## 2024-05-25 LAB — BACTERIA SPEC AEROBE CULT: NORMAL

## 2024-05-27 ENCOUNTER — HOSPITAL ENCOUNTER (OUTPATIENT)
Facility: HOSPITAL | Age: 29
Discharge: HOME | End: 2024-05-27
Attending: OBSTETRICS & GYNECOLOGY | Admitting: OBSTETRICS & GYNECOLOGY
Payer: COMMERCIAL

## 2024-05-27 VITALS
HEIGHT: 69 IN | DIASTOLIC BLOOD PRESSURE: 77 MMHG | TEMPERATURE: 98.6 F | SYSTOLIC BLOOD PRESSURE: 116 MMHG | WEIGHT: 233 LBS | BODY MASS INDEX: 34.51 KG/M2

## 2024-05-27 PROCEDURE — 59025 FETAL NON-STRESS TEST: CPT | Performed by: OBSTETRICS & GYNECOLOGY

## 2024-05-27 PROCEDURE — 59025 FETAL NON-STRESS TEST: CPT

## 2024-05-27 PROCEDURE — G0463 HOSPITAL OUTPT CLINIC VISIT: HCPCS

## 2024-05-27 PROCEDURE — 99213 OFFICE O/P EST LOW 20 MIN: CPT | Performed by: OBSTETRICS & GYNECOLOGY

## 2024-05-28 NOTE — H&P
[facility]  Obstetric History and Physical    Chief Complaint   Patient presents with    Contractions       HPI:      Patient is a 29 y.o. female  currently at 36w5d, who presents with contractions that she thought she may be in labour.  Her contractions all but stopped about the time she arrived here.    She denies vaginal leaking and bleeding.   +FM.    Contractions are mild and only about 4 per hour now.   She was 3/50 at her last check, 3/50 on admission and 3/50 after her recheck.  She is scheduled for IOL at 38 weeks         The following portions of the patients history were reviewed and updated as appropriate: current medications, allergies, past medical history, past surgical history, past family history, past social history and problem list .       Prenatal Information:   Prenatal Labs  Lab Results   Component Value Date    HEPBSAG Non-Reactive 2023    ABO O 2023    RH Positive 2023    ABSCRN Negative 2023    HEPCVIRUSABY Non-Reactive 2023         External Prenatal Results       Pregnancy Outside Results - Transcribed From Office Records - See Scanned Records For Details       Test Value Date Time    ABO  O  23 1154    Rh  Positive  23 1154    Antibody Screen  Negative  23 1154    Varicella IgG       Rubella  1.59 index 23 1154    Hgb  9.7 g/dL 24 1346       10.4 g/dL 24 1649       10.5 g/dL 24 1218       11.4 g/dL 23 1154    Hct  30.0 % 24 1346       31.9 % 24 1649       30.9 % 24 1218       35.0 % 23 1154    Glucose Fasting GTT       Glucose Tolerance Test 1 hour       Glucose Tolerance Test 3 hour       Gonorrhea (discrete)  Negative  23 1710    Chlamydia (discrete)  Negative  23 1710    RPR  Non-Reactive  23 1154    VDRL       Syphilis Antibody       HBsAg  Non-Reactive  23 1154    Herpes Simplex Virus PCR       Herpes Simplex VIrus Culture       HIV  Non-Reactive   23 1154    Hep C RNA Quant PCR       Hep C Antibody  Non-Reactive  23 1154    AFP       Group B Strep  No Group B Streptococcus isolated  24 1040    GBS Susceptibility to Clindamycin       GBS Susceptibility to Erythromycin       Fetal Fibronectin       Genetic Testing, Maternal Blood                 Drug Screening       Test Value Date Time    NIPT        Urine Drug Screen       Amphetamine Screen       Barbiturate Screen       Benzodiazepine Screen       Methadone Screen       Phencyclidine Screen       Opiates Screen       THC Screen       Cocaine Screen       Propoxyphene Screen       Buprenorphine Screen       Methamphetamine Screen       Oxycodone Screen                 Legend    ^: Historical                              Past OB History:       OB History    Para Term  AB Living   3 2 2 0 0 2   SAB IAB Ectopic Molar Multiple Live Births   0 0 0 0 0 2      # Outcome Date GA Lbr Rafael/2nd Weight Sex Type Anes PTL Lv   3 Current            2 Term 10/26/20 38w0d  4111 g (9 lb 1 oz) M Vag-Spont EPI  ADITYA      Complications: Perineal laceration during delivery   1 Term 02/15/17 38w0d  3799 g (8 lb 6 oz) M Vag-Spont EPI  ADITYA      Complications: Perineal laceration during delivery      Obstetric Comments   Fob #1 - Pregnancy #1 and #2        Past Medical History: Past Medical History:   Diagnosis Date    Abscess of breast associated with lactation 2017    Acute mastitis of left breast 2017    GERD (gastroesophageal reflux disease)     Gestational diabetes     History of Ovarian cyst     right and left    History of postpartum depression     vaginal delivery    History of Vaginal delivery     x2 - with laceration    Hx of migraines     Post partum depression 2021    Retinal tear 2020    Sinusitis, chronic     May need nasal surgery after pregnancy      Past Surgical History History reviewed. No pertinent surgical history.   Family History: History  reviewed. No pertinent family history.   Social History:  reports that she has never smoked. She has never been exposed to tobacco smoke. She has never used smokeless tobacco.   reports no history of alcohol use.   reports no history of drug use.        General ROS: Denies fever, HA, shortness of air, muscle weakness, and rashes    Objective       Vital Signs Range for the last 24 hours  Temperature: Temp:  [98.6 °F (37 °C)] 98.6 °F (37 °C)   Temp Source: Temp src: Oral   BP: BP: (116)/(77) 116/77   Pulse:  82   Respirations:  16   SPO2:     O2 Amount (l/min):     O2 Devices     Weight: Weight:  [106 kg (233 lb)] 106 kg (233 lb)     Physical Examination: Alert and oriented X 3  Chest - Breathing is unlaboured   Heart - regular rate   Abdomen - no rebound tenderness noted  bowel sounds normal  Gravid uterus, No RUQ pain, No guarding  Extremities - Non-tender bilaterally    Cervix: Exam by:  R.N.    Dilation:  3   Effacement:  50   Station:  -2       Fetal Heart Rate Assessment   Method:     Beats/min:     Baseline:  130s   Varibility:  mod   Accels:  y   Decels:  n   Tracing Category:  1     Uterine Assessment   Method:     Frequency (min):    4 per hour    Ctx Count in 10 min:     Duration:     Intensity:  Mild    Intensity by IUPC:     Resting Tone:     Resting Tone by IUNORMAN:     Nancy Units:       kathia Cardoza  at 36w5d with an DARLYN of 2024, by Last Menstrual Period,  Non stress test.    Indication : fasle labour   Start time   End time   15 x 15 accelerations x 2  Yes  No decelerations  Baseline   130s  Reactive NST  Yes          Assessment:  1.  Intrauterine pregnancy at 36w5d weeks gestation with reactive fetal status.    2.  False labour not ruptured  3.  GBS negative  Plan:  1. FHTs  Reactive NST  2. No cervical change or signs and symptoms of SROM or labour  3. Reviewed labour guidelines  4. All questions have been answered.  5. Discharge home with routine OB  follow-up      Michael Haddad MD  5/27/2024  22:16 EDT

## 2024-05-29 ENCOUNTER — ROUTINE PRENATAL (OUTPATIENT)
Dept: OBSTETRICS AND GYNECOLOGY | Facility: HOSPITAL | Age: 29
End: 2024-05-29
Payer: COMMERCIAL

## 2024-05-29 VITALS — DIASTOLIC BLOOD PRESSURE: 72 MMHG | WEIGHT: 232 LBS | SYSTOLIC BLOOD PRESSURE: 132 MMHG | BODY MASS INDEX: 34.26 KG/M2

## 2024-05-29 DIAGNOSIS — O24.410 DIET CONTROLLED GESTATIONAL DIABETES MELLITUS (GDM) IN THIRD TRIMESTER: ICD-10-CM

## 2024-05-29 DIAGNOSIS — J01.00 ACUTE MAXILLARY SINUSITIS, RECURRENCE NOT SPECIFIED: ICD-10-CM

## 2024-05-29 DIAGNOSIS — O26.849 UTERINE SIZE DATE DISCREPANCY PREGNANCY: ICD-10-CM

## 2024-05-29 DIAGNOSIS — O24.410 GDM (GESTATIONAL DIABETES MELLITUS), CLASS A1: Primary | ICD-10-CM

## 2024-05-29 NOTE — ASSESSMENT & PLAN NOTE
Patient has been wearing her Dexcom and watching what she is eating. Much better blood glucose control this past week.      - Still with LGA and GDMA1, plan for IOL at 38wks

## 2024-05-29 NOTE — PROGRESS NOTES
Follow-Up Prenatal Visit     Patient Name: Marilynn Call  : 1995   MRN: [id number]     Subjective     Marilynn Call is a 29 y.o.  at 37w0d who is here today for follow-up prenatal visit. She has had increased cramping and pressure over the last couple of days. No LOF/VB. Some decreased FM yesterday that then improved.      Objective     Vitals:    24 1136   BP: 132/72   Weight: 105 kg (232 lb)     Body mass index is 34.26 kg/m².    Physical Exam  Constitutional:       Appearance: Normal appearance. She is normal weight.   HENT:      Head: Normocephalic and atraumatic.   Pulmonary:      Effort: Pulmonary effort is normal.   Musculoskeletal:         General: Normal range of motion.   Neurological:      General: No focal deficit present.      Mental Status: She is alert and oriented to person, place, and time.   Psychiatric:         Mood and Affect: Mood normal.         Behavior: Behavior normal.         Thought Content: Thought content normal.         Judgment: Judgment normal.     SVE -- 3-4/50/-3/medium consistency/mid-position    NST:  Indication -- LGA, GDMA1  Duration -- 1148 to 1210  Outcome -- baseline 145bpm, moderate variability, ++accels, -decels; no contractions    Lab Results   Component Value Date    CREATININE 0.53 (L) 2024    AST 13 2024    ALT 9 2024    HCT 30.0 (L) 2024    HGB 9.7 (L) 2024     2024    TSH 1.090 2024     Procedures  US  -- Vtx, anterior placenta, nl URIAH, EFW 82nd% 3319gms, AC 97th%, BPP 8/8, UA Dopplers wnl, limited but normal anatomy.       Assessment / Plan      Assessment/Plan:   Diagnoses and all orders for this visit:    1. GDM (gestational diabetes mellitus), class A1 (Primary)  -     Fetal Nonstress Test; Future  -     POC Urinalysis Dipstick    2. Acute maxillary sinusitis, recurrence not specified    3. Diet controlled gestational diabetes mellitus (GDM) in third trimester  Assessment &  Plan:  Patient has been wearing her Dexcom and watching what she is eating. Much better blood glucose control this past week.      - Still with LGA and GDMA1, plan for IOL at 38wks      4. Uterine size date discrepancy pregnancy  Assessment & Plan:  Patient with LGA baby. Plan is for IOL next week.           Follow Up:   No follow-ups on file.  IOL scheduled for 38wks    I spent 20 minutes caring for Marilynn on this date of service. This time includes time spent by me in the following activities: preparing for the visit, reviewing tests, obtaining and/or reviewing a separately obtained history, performing a medically appropriate examination and/or evaluation , counseling and educating the patient/family/caregiver, documenting information in the medical record, and independently interpreting results and communicating that information with the patient/family/caregiver    Ny Gutierrez MD

## 2024-05-29 NOTE — PROGRESS NOTES
Patient presents for OB visit with NST. She reports decreased fetal movement since yesterday. NIPT and AFP negative. GBS negative. Was seen in triage 5/27/24 for contractions, cervix still 3/50. Denies contractions, vaginal bleeding, or loss of fluid today.

## 2024-06-04 ENCOUNTER — HOSPITAL ENCOUNTER (INPATIENT)
Facility: HOSPITAL | Age: 29
LOS: 4 days | Discharge: HOME | End: 2024-06-08
Attending: OBSTETRICS & GYNECOLOGY | Admitting: STUDENT IN AN ORGANIZED HEALTH CARE EDUCATION/TRAINING PROGRAM
Payer: COMMERCIAL

## 2024-06-04 DIAGNOSIS — Z98.890 POST-OPERATIVE STATE: ICD-10-CM

## 2024-06-04 PROBLEM — Z34.90 ENCOUNTER FOR INDUCTION OF LABOR: Status: ACTIVE | Noted: 2024-06-04

## 2024-06-04 LAB
ABO GROUP BLD: NORMAL
ALP SERPL-CCNC: 130 U/L (ref 39–117)
ALT SERPL W P-5'-P-CCNC: 6 U/L (ref 1–33)
AMPHET+METHAMPHET UR QL: NEGATIVE
AMPHETAMINES UR QL: NEGATIVE
AST SERPL-CCNC: 12 U/L (ref 1–32)
BARBITURATES UR QL SCN: NEGATIVE
BENZODIAZ UR QL SCN: NEGATIVE
BILIRUB SERPL-MCNC: 0.2 MG/DL (ref 0–1.2)
BLD GP AB SCN SERPL QL: NEGATIVE
BUPRENORPHINE SERPL-MCNC: NEGATIVE NG/ML
CANNABINOIDS SERPL QL: NEGATIVE
COCAINE UR QL: NEGATIVE
CREAT SERPL-MCNC: 0.72 MG/DL (ref 0.57–1)
DEPRECATED RDW RBC AUTO: 43.4 FL (ref 37–54)
ERYTHROCYTE [DISTWIDTH] IN BLOOD BY AUTOMATED COUNT: 13.9 % (ref 12.3–15.4)
FENTANYL UR-MCNC: NEGATIVE NG/ML
HCT VFR BLD AUTO: 30.6 % (ref 34–46.6)
HGB BLD-MCNC: 9.7 G/DL (ref 12–15.9)
LDH SERPL-CCNC: 134 U/L (ref 135–214)
MCH RBC QN AUTO: 27.6 PG (ref 26.6–33)
MCHC RBC AUTO-ENTMCNC: 31.7 G/DL (ref 31.5–35.7)
MCV RBC AUTO: 87.2 FL (ref 79–97)
METHADONE UR QL SCN: NEGATIVE
OPIATES UR QL: NEGATIVE
OXYCODONE UR QL SCN: NEGATIVE
PCP UR QL SCN: NEGATIVE
PLATELET # BLD AUTO: 173 10*3/MM3 (ref 140–450)
PMV BLD AUTO: 10 FL (ref 6–12)
RBC # BLD AUTO: 3.51 10*6/MM3 (ref 3.77–5.28)
RH BLD: POSITIVE
T&S EXPIRATION DATE: NORMAL
TRICYCLICS UR QL SCN: NEGATIVE
URATE SERPL-MCNC: 3.7 MG/DL (ref 2.4–5.7)
WBC NRBC COR # BLD AUTO: 9.12 10*3/MM3 (ref 3.4–10.8)

## 2024-06-04 PROCEDURE — 86850 RBC ANTIBODY SCREEN: CPT | Performed by: OBSTETRICS & GYNECOLOGY

## 2024-06-04 PROCEDURE — 86900 BLOOD TYPING SEROLOGIC ABO: CPT | Performed by: OBSTETRICS & GYNECOLOGY

## 2024-06-04 PROCEDURE — 83615 LACTATE (LD) (LDH) ENZYME: CPT | Performed by: OBSTETRICS & GYNECOLOGY

## 2024-06-04 PROCEDURE — S0260 H&P FOR SURGERY: HCPCS | Performed by: STUDENT IN AN ORGANIZED HEALTH CARE EDUCATION/TRAINING PROGRAM

## 2024-06-04 PROCEDURE — 84460 ALANINE AMINO (ALT) (SGPT): CPT | Performed by: OBSTETRICS & GYNECOLOGY

## 2024-06-04 PROCEDURE — 85027 COMPLETE CBC AUTOMATED: CPT | Performed by: OBSTETRICS & GYNECOLOGY

## 2024-06-04 PROCEDURE — 84550 ASSAY OF BLOOD/URIC ACID: CPT | Performed by: OBSTETRICS & GYNECOLOGY

## 2024-06-04 PROCEDURE — 82247 BILIRUBIN TOTAL: CPT | Performed by: OBSTETRICS & GYNECOLOGY

## 2024-06-04 PROCEDURE — 86923 COMPATIBILITY TEST ELECTRIC: CPT

## 2024-06-04 PROCEDURE — 80307 DRUG TEST PRSMV CHEM ANLYZR: CPT | Performed by: OBSTETRICS & GYNECOLOGY

## 2024-06-04 PROCEDURE — 86780 TREPONEMA PALLIDUM: CPT | Performed by: OBSTETRICS & GYNECOLOGY

## 2024-06-04 PROCEDURE — 82565 ASSAY OF CREATININE: CPT | Performed by: OBSTETRICS & GYNECOLOGY

## 2024-06-04 PROCEDURE — 84450 TRANSFERASE (AST) (SGOT): CPT | Performed by: OBSTETRICS & GYNECOLOGY

## 2024-06-04 PROCEDURE — 84075 ASSAY ALKALINE PHOSPHATASE: CPT | Performed by: OBSTETRICS & GYNECOLOGY

## 2024-06-04 PROCEDURE — 86901 BLOOD TYPING SEROLOGIC RH(D): CPT | Performed by: OBSTETRICS & GYNECOLOGY

## 2024-06-04 PROCEDURE — 59025 FETAL NON-STRESS TEST: CPT

## 2024-06-04 PROCEDURE — 25810000003 LACTATED RINGERS PER 1000 ML: Performed by: STUDENT IN AN ORGANIZED HEALTH CARE EDUCATION/TRAINING PROGRAM

## 2024-06-04 RX ORDER — LIDOCAINE HYDROCHLORIDE 10 MG/ML
0.5 INJECTION, SOLUTION EPIDURAL; INFILTRATION; INTRACAUDAL; PERINEURAL ONCE AS NEEDED
Status: DISCONTINUED | OUTPATIENT
Start: 2024-06-04 | End: 2024-06-05

## 2024-06-04 RX ORDER — SODIUM CHLORIDE 9 MG/ML
40 INJECTION, SOLUTION INTRAVENOUS AS NEEDED
Status: DISCONTINUED | OUTPATIENT
Start: 2024-06-04 | End: 2024-06-05

## 2024-06-04 RX ORDER — CALCIUM CARBONATE 500 MG/1
2 TABLET, CHEWABLE ORAL 3 TIMES DAILY PRN
Status: DISCONTINUED | OUTPATIENT
Start: 2024-06-04 | End: 2024-06-08 | Stop reason: HOSPADM

## 2024-06-04 RX ORDER — MAGNESIUM CARB/ALUMINUM HYDROX 105-160MG
30 TABLET,CHEWABLE ORAL ONCE
Status: DISCONTINUED | OUTPATIENT
Start: 2024-06-04 | End: 2024-06-05

## 2024-06-04 RX ORDER — SODIUM CHLORIDE, SODIUM LACTATE, POTASSIUM CHLORIDE, CALCIUM CHLORIDE 600; 310; 30; 20 MG/100ML; MG/100ML; MG/100ML; MG/100ML
125 INJECTION, SOLUTION INTRAVENOUS CONTINUOUS
Status: DISCONTINUED | OUTPATIENT
Start: 2024-06-04 | End: 2024-06-08 | Stop reason: HOSPADM

## 2024-06-04 RX ORDER — OXYTOCIN/0.9 % SODIUM CHLORIDE 30/500 ML
2-20 PLASTIC BAG, INJECTION (ML) INTRAVENOUS
Status: DISCONTINUED | OUTPATIENT
Start: 2024-06-04 | End: 2024-06-05

## 2024-06-04 RX ORDER — SODIUM CHLORIDE 0.9 % (FLUSH) 0.9 %
10 SYRINGE (ML) INJECTION EVERY 12 HOURS SCHEDULED
Status: DISCONTINUED | OUTPATIENT
Start: 2024-06-04 | End: 2024-06-05

## 2024-06-04 RX ORDER — ACETAMINOPHEN 325 MG/1
650 TABLET ORAL EVERY 4 HOURS PRN
Status: DISCONTINUED | OUTPATIENT
Start: 2024-06-04 | End: 2024-06-05

## 2024-06-04 RX ORDER — SODIUM CHLORIDE 0.9 % (FLUSH) 0.9 %
10 SYRINGE (ML) INJECTION AS NEEDED
Status: DISCONTINUED | OUTPATIENT
Start: 2024-06-04 | End: 2024-06-05

## 2024-06-04 RX ADMIN — SODIUM CHLORIDE, POTASSIUM CHLORIDE, SODIUM LACTATE AND CALCIUM CHLORIDE 125 ML/HR: 600; 310; 30; 20 INJECTION, SOLUTION INTRAVENOUS at 22:59

## 2024-06-05 ENCOUNTER — ANESTHESIA EVENT (OUTPATIENT)
Dept: LABOR AND DELIVERY | Facility: HOSPITAL | Age: 29
End: 2024-06-05
Payer: COMMERCIAL

## 2024-06-05 ENCOUNTER — ANESTHESIA (OUTPATIENT)
Dept: LABOR AND DELIVERY | Facility: HOSPITAL | Age: 29
End: 2024-06-05
Payer: COMMERCIAL

## 2024-06-05 PROBLEM — O21.9 NAUSEA AND VOMITING OF PREGNANCY, ANTEPARTUM: Status: RESOLVED | Noted: 2023-11-22 | Resolved: 2024-06-05

## 2024-06-05 PROBLEM — O26.849 UTERINE SIZE DATE DISCREPANCY PREGNANCY: Status: RESOLVED | Noted: 2024-04-10 | Resolved: 2024-06-05

## 2024-06-05 PROBLEM — Z98.890 POST-OPERATIVE STATE: Status: ACTIVE | Noted: 2024-06-05

## 2024-06-05 PROBLEM — Z34.90 ENCOUNTER FOR INDUCTION OF LABOR: Status: RESOLVED | Noted: 2024-06-04 | Resolved: 2024-06-05

## 2024-06-05 PROBLEM — J32.0 CHRONIC MAXILLARY SINUSITIS: Status: RESOLVED | Noted: 2024-04-10 | Resolved: 2024-06-05

## 2024-06-05 PROBLEM — Z3A.11 11 WEEKS GESTATION OF PREGNANCY: Status: RESOLVED | Noted: 2023-12-05 | Resolved: 2024-06-05

## 2024-06-05 PROBLEM — Z3A.08 8 WEEKS GESTATION OF PREGNANCY: Status: RESOLVED | Noted: 2023-11-08 | Resolved: 2024-06-05

## 2024-06-05 PROBLEM — Z3A.20 20 WEEKS GESTATION OF PREGNANCY: Status: RESOLVED | Noted: 2024-04-16 | Resolved: 2024-06-05

## 2024-06-05 PROBLEM — J01.00 ACUTE MAXILLARY SINUSITIS: Status: RESOLVED | Noted: 2024-02-26 | Resolved: 2024-06-05

## 2024-06-05 PROBLEM — R51.9 HA (HEADACHE): Status: RESOLVED | Noted: 2024-03-12 | Resolved: 2024-06-05

## 2024-06-05 PROBLEM — K59.04 CHRONIC IDIOPATHIC CONSTIPATION: Status: RESOLVED | Noted: 2024-02-26 | Resolved: 2024-06-05

## 2024-06-05 LAB
ALBUMIN SERPL-MCNC: 2.6 G/DL (ref 3.5–5.2)
ALBUMIN SERPL-MCNC: 3.3 G/DL (ref 3.5–5.2)
ALBUMIN/GLOB SERPL: 1.2 G/DL
ALBUMIN/GLOB SERPL: 1.4 G/DL
ALP SERPL-CCNC: 112 U/L (ref 39–117)
ALP SERPL-CCNC: 86 U/L (ref 39–117)
ALT SERPL W P-5'-P-CCNC: 6 U/L (ref 1–33)
ALT SERPL W P-5'-P-CCNC: 9 U/L (ref 1–33)
ANION GAP SERPL CALCULATED.3IONS-SCNC: 7 MMOL/L (ref 5–15)
ANION GAP SERPL CALCULATED.3IONS-SCNC: 8 MMOL/L (ref 5–15)
APTT PPP: 28.2 SECONDS (ref 22–39)
APTT PPP: 28.3 SECONDS (ref 22–39)
APTT PPP: 29.7 SECONDS (ref 22–39)
AST SERPL-CCNC: 16 U/L (ref 1–32)
AST SERPL-CCNC: 29 U/L (ref 1–32)
BASOPHILS # BLD AUTO: 0.02 10*3/MM3 (ref 0–0.2)
BASOPHILS NFR BLD AUTO: 0.2 % (ref 0–1.5)
BILIRUB SERPL-MCNC: 0.2 MG/DL (ref 0–1.2)
BILIRUB SERPL-MCNC: 0.3 MG/DL (ref 0–1.2)
BUN SERPL-MCNC: 6 MG/DL (ref 6–20)
BUN SERPL-MCNC: 7 MG/DL (ref 6–20)
BUN/CREAT SERPL: 8.8 (ref 7–25)
BUN/CREAT SERPL: 9.3 (ref 7–25)
CALCIUM SPEC-SCNC: 7.5 MG/DL (ref 8.6–10.5)
CALCIUM SPEC-SCNC: 8.8 MG/DL (ref 8.6–10.5)
CHLORIDE SERPL-SCNC: 107 MMOL/L (ref 98–107)
CHLORIDE SERPL-SCNC: 110 MMOL/L (ref 98–107)
CO2 SERPL-SCNC: 22 MMOL/L (ref 22–29)
CO2 SERPL-SCNC: 24 MMOL/L (ref 22–29)
CREAT SERPL-MCNC: 0.68 MG/DL (ref 0.57–1)
CREAT SERPL-MCNC: 0.75 MG/DL (ref 0.57–1)
DEPRECATED RDW RBC AUTO: 43.3 FL (ref 37–54)
DEPRECATED RDW RBC AUTO: 44.2 FL (ref 37–54)
DEPRECATED RDW RBC AUTO: 44.7 FL (ref 37–54)
EGFRCR SERPLBLD CKD-EPI 2021: 110.7 ML/MIN/1.73
EGFRCR SERPLBLD CKD-EPI 2021: 121.1 ML/MIN/1.73
EOSINOPHIL # BLD AUTO: 0.02 10*3/MM3 (ref 0–0.4)
EOSINOPHIL NFR BLD AUTO: 0.2 % (ref 0.3–6.2)
ERYTHROCYTE [DISTWIDTH] IN BLOOD BY AUTOMATED COUNT: 13.9 % (ref 12.3–15.4)
ERYTHROCYTE [DISTWIDTH] IN BLOOD BY AUTOMATED COUNT: 14.3 % (ref 12.3–15.4)
ERYTHROCYTE [DISTWIDTH] IN BLOOD BY AUTOMATED COUNT: 14.6 % (ref 12.3–15.4)
FIBRINOGEN PPP-MCNC: 204 MG/DL (ref 203–470)
FIBRINOGEN PPP-MCNC: 236 MG/DL (ref 203–470)
FIBRINOGEN PPP-MCNC: 342 MG/DL (ref 203–470)
GLOBULIN UR ELPH-MCNC: 1.8 GM/DL
GLOBULIN UR ELPH-MCNC: 2.7 GM/DL
GLUCOSE BLDC GLUCOMTR-MCNC: 101 MG/DL (ref 70–130)
GLUCOSE BLDC GLUCOMTR-MCNC: 103 MG/DL (ref 70–130)
GLUCOSE BLDC GLUCOMTR-MCNC: 131 MG/DL (ref 70–130)
GLUCOSE BLDC GLUCOMTR-MCNC: 98 MG/DL (ref 70–130)
GLUCOSE SERPL-MCNC: 119 MG/DL (ref 65–99)
GLUCOSE SERPL-MCNC: 131 MG/DL (ref 65–99)
HCT VFR BLD AUTO: 21.9 % (ref 34–46.6)
HCT VFR BLD AUTO: 22.9 % (ref 34–46.6)
HCT VFR BLD AUTO: 26.7 % (ref 34–46.6)
HGB BLD-MCNC: 7.1 G/DL (ref 12–15.9)
HGB BLD-MCNC: 7.5 G/DL (ref 12–15.9)
HGB BLD-MCNC: 8.5 G/DL (ref 12–15.9)
IMM GRANULOCYTES # BLD AUTO: 0.04 10*3/MM3 (ref 0–0.05)
IMM GRANULOCYTES NFR BLD AUTO: 0.4 % (ref 0–0.5)
INR PPP: 1.1 (ref 0.89–1.12)
INR PPP: 1.14 (ref 0.89–1.12)
INR PPP: 1.3 (ref 0.89–1.12)
LYMPHOCYTES # BLD AUTO: 1.07 10*3/MM3 (ref 0.7–3.1)
LYMPHOCYTES NFR BLD AUTO: 9.7 % (ref 19.6–45.3)
MCH RBC QN AUTO: 27.3 PG (ref 26.6–33)
MCH RBC QN AUTO: 27.5 PG (ref 26.6–33)
MCH RBC QN AUTO: 27.6 PG (ref 26.6–33)
MCHC RBC AUTO-ENTMCNC: 31.8 G/DL (ref 31.5–35.7)
MCHC RBC AUTO-ENTMCNC: 32.4 G/DL (ref 31.5–35.7)
MCHC RBC AUTO-ENTMCNC: 32.8 G/DL (ref 31.5–35.7)
MCV RBC AUTO: 84.2 FL (ref 79–97)
MCV RBC AUTO: 84.2 FL (ref 79–97)
MCV RBC AUTO: 86.4 FL (ref 79–97)
MONOCYTES # BLD AUTO: 0.96 10*3/MM3 (ref 0.1–0.9)
MONOCYTES NFR BLD AUTO: 8.7 % (ref 5–12)
NEUTROPHILS NFR BLD AUTO: 8.97 10*3/MM3 (ref 1.7–7)
NEUTROPHILS NFR BLD AUTO: 80.8 % (ref 42.7–76)
NRBC BLD AUTO-RTO: 0 /100 WBC (ref 0–0.2)
PLATELET # BLD AUTO: 143 10*3/MM3 (ref 140–450)
PLATELET # BLD AUTO: 149 10*3/MM3 (ref 140–450)
PLATELET # BLD AUTO: 179 10*3/MM3 (ref 140–450)
PMV BLD AUTO: 10 FL (ref 6–12)
PMV BLD AUTO: 9.4 FL (ref 6–12)
PMV BLD AUTO: 9.6 FL (ref 6–12)
POTASSIUM SERPL-SCNC: 4.1 MMOL/L (ref 3.5–5.2)
POTASSIUM SERPL-SCNC: 4.3 MMOL/L (ref 3.5–5.2)
PROT SERPL-MCNC: 4.4 G/DL (ref 6–8.5)
PROT SERPL-MCNC: 6 G/DL (ref 6–8.5)
PROTHROMBIN TIME: 14.4 SECONDS (ref 12.2–14.5)
PROTHROMBIN TIME: 14.7 SECONDS (ref 12.2–14.5)
PROTHROMBIN TIME: 16.3 SECONDS (ref 12.2–14.5)
RBC # BLD AUTO: 2.6 10*6/MM3 (ref 3.77–5.28)
RBC # BLD AUTO: 2.72 10*6/MM3 (ref 3.77–5.28)
RBC # BLD AUTO: 3.09 10*6/MM3 (ref 3.77–5.28)
SODIUM SERPL-SCNC: 139 MMOL/L (ref 136–145)
SODIUM SERPL-SCNC: 139 MMOL/L (ref 136–145)
T PALLIDUM IGG SER QL: NORMAL
WBC NRBC COR # BLD AUTO: 10.9 10*3/MM3 (ref 3.4–10.8)
WBC NRBC COR # BLD AUTO: 11.08 10*3/MM3 (ref 3.4–10.8)
WBC NRBC COR # BLD AUTO: 12.52 10*3/MM3 (ref 3.4–10.8)

## 2024-06-05 PROCEDURE — C1755 CATHETER, INTRASPINAL: HCPCS

## 2024-06-05 PROCEDURE — 25810000003 LACTATED RINGERS PER 1000 ML: Performed by: STUDENT IN AN ORGANIZED HEALTH CARE EDUCATION/TRAINING PROGRAM

## 2024-06-05 PROCEDURE — 85730 THROMBOPLASTIN TIME PARTIAL: CPT | Performed by: OBSTETRICS & GYNECOLOGY

## 2024-06-05 PROCEDURE — 85384 FIBRINOGEN ACTIVITY: CPT | Performed by: OBSTETRICS & GYNECOLOGY

## 2024-06-05 PROCEDURE — 25010000002 MIDAZOLAM PER 1 MG: Performed by: ANESTHESIOLOGY

## 2024-06-05 PROCEDURE — 25810000003 SODIUM CHLORIDE 0.9 % SOLUTION: Performed by: ANESTHESIOLOGY

## 2024-06-05 PROCEDURE — 25010000002 KETOROLAC TROMETHAMINE PER 15 MG: Performed by: OBSTETRICS & GYNECOLOGY

## 2024-06-05 PROCEDURE — 25010000002 ONDANSETRON PER 1 MG: Performed by: NURSE ANESTHETIST, CERTIFIED REGISTERED

## 2024-06-05 PROCEDURE — 88307 TISSUE EXAM BY PATHOLOGIST: CPT | Performed by: OBSTETRICS & GYNECOLOGY

## 2024-06-05 PROCEDURE — 25810000003 LACTATED RINGERS SOLUTION: Performed by: STUDENT IN AN ORGANIZED HEALTH CARE EDUCATION/TRAINING PROGRAM

## 2024-06-05 PROCEDURE — 51703 INSERT BLADDER CATH COMPLEX: CPT

## 2024-06-05 PROCEDURE — 86927 PLASMA FRESH FROZEN: CPT

## 2024-06-05 PROCEDURE — 25010000002 ROPIVACAINE PER 1 MG: Performed by: NURSE ANESTHETIST, CERTIFIED REGISTERED

## 2024-06-05 PROCEDURE — 25010000002 METOCLOPRAMIDE PER 10 MG: Performed by: NURSE ANESTHETIST, CERTIFIED REGISTERED

## 2024-06-05 PROCEDURE — 25010000002 FENTANYL CITRATE (PF) 50 MCG/ML SOLUTION: Performed by: ANESTHESIOLOGY

## 2024-06-05 PROCEDURE — P9059 PLASMA, FRZ BETWEEN 8-24HOUR: HCPCS

## 2024-06-05 PROCEDURE — 85610 PROTHROMBIN TIME: CPT | Performed by: OBSTETRICS & GYNECOLOGY

## 2024-06-05 PROCEDURE — 25010000002 CEFAZOLIN PER 500 MG: Performed by: OBSTETRICS & GYNECOLOGY

## 2024-06-05 PROCEDURE — 80053 COMPREHEN METABOLIC PANEL: CPT | Performed by: OBSTETRICS & GYNECOLOGY

## 2024-06-05 PROCEDURE — 85027 COMPLETE CBC AUTOMATED: CPT | Performed by: OBSTETRICS & GYNECOLOGY

## 2024-06-05 PROCEDURE — P9016 RBC LEUKOCYTES REDUCED: HCPCS

## 2024-06-05 PROCEDURE — 86900 BLOOD TYPING SEROLOGIC ABO: CPT

## 2024-06-05 PROCEDURE — 25010000002 METHYLERGONOVINE MALEATE PER 0.2 MG: Performed by: STUDENT IN AN ORGANIZED HEALTH CARE EDUCATION/TRAINING PROGRAM

## 2024-06-05 PROCEDURE — 25810000003 LACTATED RINGERS PER 1000 ML: Performed by: OBSTETRICS & GYNECOLOGY

## 2024-06-05 PROCEDURE — 25810000003 LACTATED RINGERS SOLUTION: Performed by: NURSE ANESTHETIST, CERTIFIED REGISTERED

## 2024-06-05 PROCEDURE — 25010000002 OXYTOCIN PER 10 UNITS: Performed by: ANESTHESIOLOGY

## 2024-06-05 PROCEDURE — 0W3R7ZZ CONTROL BLEEDING IN GENITOURINARY TRACT, VIA NATURAL OR ARTIFICIAL OPENING: ICD-10-PCS | Performed by: OBSTETRICS & GYNECOLOGY

## 2024-06-05 PROCEDURE — P9612 CATHETERIZE FOR URINE SPEC: HCPCS

## 2024-06-05 PROCEDURE — 59025 FETAL NON-STRESS TEST: CPT

## 2024-06-05 PROCEDURE — 10D17ZZ EXTRACTION OF PRODUCTS OF CONCEPTION, RETAINED, VIA NATURAL OR ARTIFICIAL OPENING: ICD-10-PCS | Performed by: OBSTETRICS & GYNECOLOGY

## 2024-06-05 PROCEDURE — 82948 REAGENT STRIP/BLOOD GLUCOSE: CPT

## 2024-06-05 PROCEDURE — 25010000002 PROPOFOL 10 MG/ML EMULSION: Performed by: ANESTHESIOLOGY

## 2024-06-05 PROCEDURE — C1755 CATHETER, INTRASPINAL: HCPCS | Performed by: ANESTHESIOLOGY

## 2024-06-05 PROCEDURE — 85025 COMPLETE CBC W/AUTO DIFF WBC: CPT | Performed by: OBSTETRICS & GYNECOLOGY

## 2024-06-05 PROCEDURE — 10D17Z9 MANUAL EXTRACTION OF PRODUCTS OF CONCEPTION, RETAINED, VIA NATURAL OR ARTIFICIAL OPENING: ICD-10-PCS | Performed by: OBSTETRICS & GYNECOLOGY

## 2024-06-05 PROCEDURE — 0HQ9XZZ REPAIR PERINEUM SKIN, EXTERNAL APPROACH: ICD-10-PCS | Performed by: OBSTETRICS & GYNECOLOGY

## 2024-06-05 PROCEDURE — 25010000002 FENTANYL CITRATE (PF) 50 MCG/ML SOLUTION: Performed by: NURSE ANESTHETIST, CERTIFIED REGISTERED

## 2024-06-05 PROCEDURE — 36430 TRANSFUSION BLD/BLD COMPNT: CPT

## 2024-06-05 PROCEDURE — 59410 OBSTETRICAL CARE: CPT | Performed by: OBSTETRICS & GYNECOLOGY

## 2024-06-05 RX ORDER — OXYTOCIN/0.9 % SODIUM CHLORIDE 30/500 ML
PLASTIC BAG, INJECTION (ML) INTRAVENOUS AS NEEDED
Status: DISCONTINUED | OUTPATIENT
Start: 2024-06-05 | End: 2024-06-05 | Stop reason: SURG

## 2024-06-05 RX ORDER — SODIUM CHLORIDE 9 MG/ML
INJECTION, SOLUTION INTRAVENOUS
Status: DISCONTINUED
Start: 2024-06-05 | End: 2024-06-08 | Stop reason: HOSPADM

## 2024-06-05 RX ORDER — HYDROCODONE BITARTRATE AND ACETAMINOPHEN 5; 325 MG/1; MG/1
1 TABLET ORAL EVERY 4 HOURS PRN
Status: DISCONTINUED | OUTPATIENT
Start: 2024-06-05 | End: 2024-06-08 | Stop reason: HOSPADM

## 2024-06-05 RX ORDER — PHENYLEPHRINE HCL IN 0.9% NACL 1 MG/10 ML
SYRINGE (ML) INTRAVENOUS AS NEEDED
Status: DISCONTINUED | OUTPATIENT
Start: 2024-06-05 | End: 2024-06-05 | Stop reason: SURG

## 2024-06-05 RX ORDER — FENTANYL CITRATE 50 UG/ML
INJECTION, SOLUTION INTRAMUSCULAR; INTRAVENOUS AS NEEDED
Status: DISCONTINUED | OUTPATIENT
Start: 2024-06-05 | End: 2024-06-05 | Stop reason: SURG

## 2024-06-05 RX ORDER — ROPIVACAINE HYDROCHLORIDE 2 MG/ML
15 INJECTION, SOLUTION EPIDURAL; INFILTRATION; PERINEURAL CONTINUOUS
Status: DISCONTINUED | OUTPATIENT
Start: 2024-06-05 | End: 2024-06-05

## 2024-06-05 RX ORDER — FERROUS SULFATE 325(65) MG
325 TABLET ORAL
Status: DISCONTINUED | OUTPATIENT
Start: 2024-06-06 | End: 2024-06-08 | Stop reason: HOSPADM

## 2024-06-05 RX ORDER — PROPOFOL 10 MG/ML
VIAL (ML) INTRAVENOUS AS NEEDED
Status: DISCONTINUED | OUTPATIENT
Start: 2024-06-05 | End: 2024-06-05 | Stop reason: SURG

## 2024-06-05 RX ORDER — BISACODYL 10 MG
10 SUPPOSITORY, RECTAL RECTAL DAILY PRN
Status: DISCONTINUED | OUTPATIENT
Start: 2024-06-06 | End: 2024-06-08 | Stop reason: HOSPADM

## 2024-06-05 RX ORDER — SODIUM CHLORIDE 0.9 % (FLUSH) 0.9 %
1-10 SYRINGE (ML) INJECTION AS NEEDED
Status: DISCONTINUED | OUTPATIENT
Start: 2024-06-05 | End: 2024-06-08 | Stop reason: HOSPADM

## 2024-06-05 RX ORDER — CITRIC ACID/SODIUM CITRATE 334-500MG
30 SOLUTION, ORAL ORAL ONCE
Status: COMPLETED | OUTPATIENT
Start: 2024-06-05 | End: 2024-06-05

## 2024-06-05 RX ORDER — METOCLOPRAMIDE HYDROCHLORIDE 5 MG/ML
10 INJECTION INTRAMUSCULAR; INTRAVENOUS ONCE AS NEEDED
Status: COMPLETED | OUTPATIENT
Start: 2024-06-05 | End: 2024-06-05

## 2024-06-05 RX ORDER — METHYLERGONOVINE MALEATE 0.2 MG/ML
200 INJECTION INTRAVENOUS ONCE AS NEEDED
Status: COMPLETED | OUTPATIENT
Start: 2024-06-05 | End: 2024-06-05

## 2024-06-05 RX ORDER — LIDOCAINE HYDROCHLORIDE AND EPINEPHRINE 15; 5 MG/ML; UG/ML
INJECTION, SOLUTION EPIDURAL AS NEEDED
Status: DISCONTINUED | OUTPATIENT
Start: 2024-06-05 | End: 2024-06-05 | Stop reason: SURG

## 2024-06-05 RX ORDER — SUCCINYLCHOLINE/SOD CL,ISO/PF 200MG/10ML
SYRINGE (ML) INTRAVENOUS AS NEEDED
Status: DISCONTINUED | OUTPATIENT
Start: 2024-06-05 | End: 2024-06-05 | Stop reason: SURG

## 2024-06-05 RX ORDER — MISOPROSTOL 200 UG/1
800 TABLET ORAL ONCE AS NEEDED
Status: DISCONTINUED | OUTPATIENT
Start: 2024-06-05 | End: 2024-06-05

## 2024-06-05 RX ORDER — IBUPROFEN 600 MG/1
600 TABLET ORAL EVERY 6 HOURS SCHEDULED
Status: DISCONTINUED | OUTPATIENT
Start: 2024-06-06 | End: 2024-06-06

## 2024-06-05 RX ORDER — OXYTOCIN/0.9 % SODIUM CHLORIDE 30/500 ML
250 PLASTIC BAG, INJECTION (ML) INTRAVENOUS CONTINUOUS
Status: DISPENSED | OUTPATIENT
Start: 2024-06-05 | End: 2024-06-05

## 2024-06-05 RX ORDER — TRANEXAMIC ACID 10 MG/ML
INJECTION, SOLUTION INTRAVENOUS AS NEEDED
Status: DISCONTINUED | OUTPATIENT
Start: 2024-06-05 | End: 2024-06-05 | Stop reason: SURG

## 2024-06-05 RX ORDER — MIDAZOLAM HYDROCHLORIDE 1 MG/ML
INJECTION INTRAMUSCULAR; INTRAVENOUS AS NEEDED
Status: DISCONTINUED | OUTPATIENT
Start: 2024-06-05 | End: 2024-06-05 | Stop reason: SURG

## 2024-06-05 RX ORDER — OXYTOCIN 10 [USP'U]/ML
INJECTION, SOLUTION INTRAMUSCULAR; INTRAVENOUS AS NEEDED
Status: DISCONTINUED | OUTPATIENT
Start: 2024-06-05 | End: 2024-06-05 | Stop reason: SURG

## 2024-06-05 RX ORDER — DIPHENHYDRAMINE HYDROCHLORIDE 50 MG/ML
12.5 INJECTION INTRAMUSCULAR; INTRAVENOUS EVERY 8 HOURS PRN
Status: DISCONTINUED | OUTPATIENT
Start: 2024-06-05 | End: 2024-06-05

## 2024-06-05 RX ORDER — HYDROCORTISONE 25 MG/G
1 CREAM TOPICAL AS NEEDED
Status: DISCONTINUED | OUTPATIENT
Start: 2024-06-05 | End: 2024-06-08 | Stop reason: HOSPADM

## 2024-06-05 RX ORDER — EPHEDRINE SULFATE 5 MG/ML
INJECTION INTRAVENOUS AS NEEDED
Status: DISCONTINUED | OUTPATIENT
Start: 2024-06-05 | End: 2024-06-05 | Stop reason: SURG

## 2024-06-05 RX ORDER — KETOROLAC TROMETHAMINE 30 MG/ML
30 INJECTION, SOLUTION INTRAMUSCULAR; INTRAVENOUS ONCE
Status: COMPLETED | OUTPATIENT
Start: 2024-06-05 | End: 2024-06-05

## 2024-06-05 RX ORDER — DOCUSATE SODIUM 100 MG/1
100 CAPSULE, LIQUID FILLED ORAL 2 TIMES DAILY
Status: DISCONTINUED | OUTPATIENT
Start: 2024-06-05 | End: 2024-06-08 | Stop reason: HOSPADM

## 2024-06-05 RX ORDER — CARBOPROST TROMETHAMINE 250 UG/ML
250 INJECTION, SOLUTION INTRAMUSCULAR
Status: DISCONTINUED | OUTPATIENT
Start: 2024-06-05 | End: 2024-06-05

## 2024-06-05 RX ORDER — OXYTOCIN/0.9 % SODIUM CHLORIDE 30/500 ML
125 PLASTIC BAG, INJECTION (ML) INTRAVENOUS ONCE AS NEEDED
Status: DISCONTINUED | OUTPATIENT
Start: 2024-06-05 | End: 2024-06-08 | Stop reason: HOSPADM

## 2024-06-05 RX ORDER — HYDROCODONE BITARTRATE AND ACETAMINOPHEN 10; 325 MG/1; MG/1
1 TABLET ORAL EVERY 4 HOURS PRN
Status: DISCONTINUED | OUTPATIENT
Start: 2024-06-05 | End: 2024-06-08 | Stop reason: HOSPADM

## 2024-06-05 RX ORDER — HYDROXYZINE HYDROCHLORIDE 25 MG/1
50 TABLET, FILM COATED ORAL NIGHTLY PRN
Status: CANCELLED | OUTPATIENT
Start: 2024-06-05

## 2024-06-05 RX ORDER — FAMOTIDINE 10 MG/ML
INJECTION, SOLUTION INTRAVENOUS AS NEEDED
Status: DISCONTINUED | OUTPATIENT
Start: 2024-06-05 | End: 2024-06-05 | Stop reason: SURG

## 2024-06-05 RX ORDER — ONDANSETRON 2 MG/ML
4 INJECTION INTRAMUSCULAR; INTRAVENOUS ONCE AS NEEDED
Status: COMPLETED | OUTPATIENT
Start: 2024-06-05 | End: 2024-06-05

## 2024-06-05 RX ORDER — OXYTOCIN/0.9 % SODIUM CHLORIDE 30/500 ML
999 PLASTIC BAG, INJECTION (ML) INTRAVENOUS ONCE
Status: COMPLETED | OUTPATIENT
Start: 2024-06-05 | End: 2024-06-05

## 2024-06-05 RX ORDER — ACETAMINOPHEN 325 MG/1
650 TABLET ORAL EVERY 6 HOURS
Status: DISCONTINUED | OUTPATIENT
Start: 2024-06-05 | End: 2024-06-08 | Stop reason: HOSPADM

## 2024-06-05 RX ORDER — SODIUM CHLORIDE 9 MG/ML
INJECTION, SOLUTION INTRAVENOUS CONTINUOUS PRN
Status: DISCONTINUED | OUTPATIENT
Start: 2024-06-05 | End: 2024-06-05 | Stop reason: SURG

## 2024-06-05 RX ORDER — EPHEDRINE SULFATE 5 MG/ML
10 INJECTION INTRAVENOUS
Status: DISCONTINUED | OUTPATIENT
Start: 2024-06-05 | End: 2024-06-05

## 2024-06-05 RX ORDER — SIMETHICONE 80 MG
80 TABLET,CHEWABLE ORAL 4 TIMES DAILY
Status: DISCONTINUED | OUTPATIENT
Start: 2024-06-05 | End: 2024-06-08 | Stop reason: HOSPADM

## 2024-06-05 RX ADMIN — CALCIUM CARBONATE (ANTACID) CHEW TAB 500 MG 2 TABLET: 500 CHEW TAB at 00:13

## 2024-06-05 RX ADMIN — SODIUM CHLORIDE, POTASSIUM CHLORIDE, SODIUM LACTATE AND CALCIUM CHLORIDE 125 ML/HR: 600; 310; 30; 20 INJECTION, SOLUTION INTRAVENOUS at 06:43

## 2024-06-05 RX ADMIN — ROPIVACAINE HYDROCHLORIDE 10 ML: 5 INJECTION, SOLUTION EPIDURAL; INFILTRATION; PERINEURAL at 09:53

## 2024-06-05 RX ADMIN — SODIUM CHLORIDE: 9 INJECTION, SOLUTION INTRAVENOUS at 17:00

## 2024-06-05 RX ADMIN — SODIUM CHLORIDE, POTASSIUM CHLORIDE, SODIUM LACTATE AND CALCIUM CHLORIDE 125 ML/HR: 600; 310; 30; 20 INJECTION, SOLUTION INTRAVENOUS at 21:40

## 2024-06-05 RX ADMIN — EPHEDRINE SULFATE 20 MG: 5 INJECTION INTRAVENOUS at 16:37

## 2024-06-05 RX ADMIN — TRANEXAMIC ACID 1000 MG: 10 INJECTION, SOLUTION INTRAVENOUS at 16:54

## 2024-06-05 RX ADMIN — SODIUM CHLORIDE, POTASSIUM CHLORIDE, SODIUM LACTATE AND CALCIUM CHLORIDE 1000 ML: 600; 310; 30; 20 INJECTION, SOLUTION INTRAVENOUS at 09:05

## 2024-06-05 RX ADMIN — KETOROLAC TROMETHAMINE 30 MG: 30 INJECTION, SOLUTION INTRAMUSCULAR; INTRAVENOUS at 16:16

## 2024-06-05 RX ADMIN — ACETAMINOPHEN 650 MG: 325 TABLET ORAL at 21:50

## 2024-06-05 RX ADMIN — MIDAZOLAM 2 MG: 1 INJECTION INTRAMUSCULAR; INTRAVENOUS at 16:32

## 2024-06-05 RX ADMIN — METOCLOPRAMIDE 10 MG: 5 INJECTION, SOLUTION INTRAMUSCULAR; INTRAVENOUS at 16:32

## 2024-06-05 RX ADMIN — METHYLERGONOVINE MALEATE 200 MCG: 0.2 INJECTION, SOLUTION INTRAMUSCULAR; INTRAVENOUS at 17:15

## 2024-06-05 RX ADMIN — SIMETHICONE 80 MG: 80 TABLET, CHEWABLE ORAL at 21:50

## 2024-06-05 RX ADMIN — LIDOCAINE HYDROCHLORIDE AND EPINEPHRINE 3 ML: 15; 5 INJECTION, SOLUTION EPIDURAL at 09:49

## 2024-06-05 RX ADMIN — SODIUM CHLORIDE, POTASSIUM CHLORIDE, SODIUM LACTATE AND CALCIUM CHLORIDE: 600; 310; 30; 20 INJECTION, SOLUTION INTRAVENOUS at 16:32

## 2024-06-05 RX ADMIN — OXYTOCIN 3 UNITS: 10 INJECTION, SOLUTION INTRAMUSCULAR; INTRAVENOUS at 17:22

## 2024-06-05 RX ADMIN — Medication 100 MCG: at 17:15

## 2024-06-05 RX ADMIN — SODIUM CHLORIDE 2000 MG: 900 INJECTION INTRAVENOUS at 16:16

## 2024-06-05 RX ADMIN — Medication 30 ML: at 16:25

## 2024-06-05 RX ADMIN — ONDANSETRON 4 MG: 2 INJECTION INTRAMUSCULAR; INTRAVENOUS at 16:32

## 2024-06-05 RX ADMIN — Medication 999 ML/HR: at 12:42

## 2024-06-05 RX ADMIN — Medication 999 MILLI-UNITS/MIN: at 17:55

## 2024-06-05 RX ADMIN — PROPOFOL 150 MG: 10 INJECTION, EMULSION INTRAVENOUS at 16:51

## 2024-06-05 RX ADMIN — FENTANYL CITRATE 100 MCG: 50 INJECTION, SOLUTION INTRAMUSCULAR; INTRAVENOUS at 09:51

## 2024-06-05 RX ADMIN — Medication 120 MG: at 16:51

## 2024-06-05 RX ADMIN — SODIUM CHLORIDE, POTASSIUM CHLORIDE, SODIUM LACTATE AND CALCIUM CHLORIDE 1000 ML/HR: 600; 310; 30; 20 INJECTION, SOLUTION INTRAVENOUS at 16:28

## 2024-06-05 RX ADMIN — ROPIVACAINE HYDROCHLORIDE 15 ML/HR: 2 INJECTION, SOLUTION EPIDURAL; INFILTRATION at 09:56

## 2024-06-05 RX ADMIN — Medication 500 ML: at 17:20

## 2024-06-05 RX ADMIN — SODIUM CHLORIDE, POTASSIUM CHLORIDE, SODIUM LACTATE AND CALCIUM CHLORIDE 125 ML/HR: 600; 310; 30; 20 INJECTION, SOLUTION INTRAVENOUS at 09:32

## 2024-06-05 RX ADMIN — FENTANYL CITRATE 100 MCG: 50 INJECTION, SOLUTION INTRAMUSCULAR; INTRAVENOUS at 17:04

## 2024-06-05 RX ADMIN — SODIUM CHLORIDE, POTASSIUM CHLORIDE, SODIUM LACTATE AND CALCIUM CHLORIDE 1000 ML: 600; 310; 30; 20 INJECTION, SOLUTION INTRAVENOUS at 15:11

## 2024-06-05 RX ADMIN — FAMOTIDINE 20 MG: 10 INJECTION, SOLUTION INTRAVENOUS at 16:32

## 2024-06-05 RX ADMIN — OXYTOCIN 3 UNITS: 10 INJECTION, SOLUTION INTRAMUSCULAR; INTRAVENOUS at 17:26

## 2024-06-05 RX ADMIN — Medication 200 MCG: at 16:35

## 2024-06-05 RX ADMIN — Medication 1 MILLI-UNITS/MIN: at 00:13

## 2024-06-05 RX ADMIN — OXYTOCIN 4 UNITS: 10 INJECTION, SOLUTION INTRAMUSCULAR; INTRAVENOUS at 17:30

## 2024-06-05 RX ADMIN — Medication 30 ML: at 16:16

## 2024-06-05 NOTE — H&P
CHANO Boss  Obstetric History and Physical    Scheduled IOL      Subjective     Patient is a 29 y.o. female  currently at 37w6d, who presents for scheduled IOL for A1GDM. Her prenatal care is notable for: A2 GDM, uterine size-date discrepancy with suspected LGA fetus. No complaints.    Prenatal Information:  Prenatal Results       Initial Prenatal Labs       Test Value Reference Range Date Time    Hemoglobin  10.5 g/dL 12.0 - 15.9 24 1218       11.4 g/dL 12.0 - 15.9 23 1154    Hematocrit  30.9 % 34.0 - 46.6 24 1218       35.0 % 34.0 - 46.6 23 1154    Platelets  191 10*3/mm3 140 - 450 24 1218       201 10*3/mm3 140 - 450 23 1154    Rubella IgG  1.59 index Immune >0.99 23 1154    Hepatitis B SAg  Non-Reactive  Non-Reactive 23 1154    Hepatitis C Ab  Non-Reactive  Non-Reactive 23 1154    RPR  Non-Reactive  Non-Reactive 23 1154    T. Pallidum Ab   Non-Reactive  Non-Reactive 24 1346    ABO  O   23 1154    Rh  Positive   23 1154    Antibody Screen  Negative   23 1154    HIV  Non-Reactive  Non-Reactive 23 1154    Urine Culture        Gonorrhea  Negative  Negative 23 1710    Chlamydia  Negative  Negative 23 1710    TSH  1.090 uIU/mL 0.270 - 4.200 24 1141    HgB A1c         Varicella IgG        HgB Electrophoresis         Hemoglobinopathy (genetic testing)        Cystic fibrosis                   Fetal testing        Test Value Reference Range Date Time    NIPT        MSAFP        AFP-4                  2nd and 3rd Trimester       Test Value Reference Range Date Time    Hemoglobin (repeated)  9.7 g/dL 12.0 - 15.9 24 1346       10.4 g/dL 12.0 - 15.9 24 1649    Hematocrit (repeated)  30.0 % 34.0 - 46.6 24 1346       31.9 % 34.0 - 46.6 24 1649    Platelets   200 10*3/mm3 140 - 450 03/27/24 1346       245 10*3/mm3 140 - 450 24 1649       191 10*3/mm3 140 - 450 24 1218       201  10*3/mm3 140 - 450 12/05/23 1154    1 hour GTT   129 mg/dL 65 - 139 03/27/24 1346    Antibody Screen (repeated)        3rd TM syphilis scrn (repeated)  RPR         3rd TM syphilis scrn (repeated) FTA        GTT Fasting        GTT 1 Hr        GTT 2 Hr        GTT 3 Hr        Group B Strep  No Group B Streptococcus isolated   05/22/24 1040              Other testing        Test Value Reference Range Date Time    Parvo IgG         CMV IgG                   Drug Screening       Test Value Reference Range Date Time    Amphetamine Screen        Barbiturate Screen        Benzodiazepine Screen        Methadone Screen        Phencyclidine Screen        Opiates Screen        THC Screen        Cocaine Screen        Propoxyphene Screen        Buprenorphine Screen        Methamphetamine Screen        Oxycodone Screen        Tricyclic Antidepressants Screen                  Legend    ^: Historical                          External Prenatal Results       Pregnancy Outside Results - Transcribed From Office Records - See Scanned Records For Details       Test Value Date Time    ABO  O  12/05/23 1154    Rh  Positive  12/05/23 1154    Antibody Screen  Negative  12/05/23 1154    Varicella IgG       Rubella  1.59 index 12/05/23 1154    Hgb  9.7 g/dL 03/27/24 1346       10.4 g/dL 03/12/24 1649       10.5 g/dL 01/30/24 1218       11.4 g/dL 12/05/23 1154    Hct  30.0 % 03/27/24 1346       31.9 % 03/12/24 1649       30.9 % 01/30/24 1218       35.0 % 12/05/23 1154    HgB A1c        1h GTT  129 mg/dL 03/27/24 1346    3h GTT Fasting       3h GTT 1 hour       3h GTT 2 hour       3h GTT 3 hour        Gonorrhea (discrete)  Negative  11/06/23 1710    Chlamydia (discrete)  Negative  11/06/23 1710    RPR  Non-Reactive  12/05/23 1154    Syphilis Antibody       HBsAg  Non-Reactive  12/05/23 1154    Herpes Simplex Virus PCR       Herpes Simplex VIrus Culture       HIV  Non-Reactive  12/05/23 1154    Hep C RNA Quant PCR       Hep C Antibody  Non-Reactive   23 1154    AFP       NIPT       Cystic Fibroisis        Group B Strep  No Group B Streptococcus isolated  24 1040    GBS Susceptibility to Clindamycin       GBS Susceptibility to Erythromycin       Fetal Fibronectin       Genetic Testing, Maternal Blood                 Drug Screening       Test Value Date Time    Urine Drug Screen       Amphetamine Screen       Barbiturate Screen       Benzodiazepine Screen       Methadone Screen       Phencyclidine Screen       Opiates Screen       THC Screen       Cocaine Screen       Propoxyphene Screen       Buprenorphine Screen       Methamphetamine Screen       Oxycodone Screen       Tricyclic Antidepressants Screen                 Legend    ^: Historical                             Past OB History:     OB History    Para Term  AB Living   3 2 2 0 0 2   SAB IAB Ectopic Molar Multiple Live Births   0 0 0 0 0 2      # Outcome Date GA Lbr Rafael/2nd Weight Sex Type Anes PTL Lv   3 Current            2 Term 10/26/20 38w0d  4111 g (9 lb 1 oz) M Vag-Spont EPI  ADITYA      Complications: Perineal laceration during delivery   1 Term 02/15/17 38w0d  3799 g (8 lb 6 oz) M Vag-Spont EPI  ADITYA      Complications: Perineal laceration during delivery      Obstetric Comments   Fob #1 - Pregnancy #1 and #2        Past Medical History: Past Medical History:   Diagnosis Date    Abscess of breast associated with lactation 2017    Acute mastitis of left breast 2017    GERD (gastroesophageal reflux disease)     Gestational diabetes     History of Ovarian cyst     right and left    History of postpartum depression     vaginal delivery    History of Vaginal delivery     x2 - with laceration    Hx of migraines     Post partum depression 2021    Retinal tear 2020    Sinusitis, chronic     May need nasal surgery after pregnancy      Past Surgical History History reviewed. No pertinent surgical history.   Family History: History reviewed. No  pertinent family history.   Social History:  reports that she has never smoked. She has never been exposed to tobacco smoke. She has never used smokeless tobacco.   reports no history of alcohol use.   reports no history of drug use.        General ROS: Pertinent items are noted in HPI, all other systems reviewed and negative    Objective     Vital Signs Range for the last 24 hours  Temperature: Temp:  [98.6 °F (37 °C)] 98.6 °F (37 °C)   Temp Source: Temp src: Oral   BP: BP: (124)/(77) 124/77   Pulse: Heart Rate:  [109] 109   Respirations: Resp:  [18] 18   SPO2:     O2 Amount (l/min):     O2 Devices     Weight: Weight:  [105 kg (232 lb)] 105 kg (232 lb)       Physical Examination:   GEN: alert and oriented, no acute distress  HEENT: atraumatic, normocephalic  CV: well-perfused  PULM: no increased work of breathing  ABD: gravid, nontender  MSK: minimal peripheral edema  NEURO: cranial nerves II - XII grossly intact  PSYCH: mood and affect appropriate    SVE: 3-4/50/-3/medium consistency/mid-position in clinic on 5/29. 4/70/-2 per RN on admission    SSE: N/A    Labs: pend    Imaging: cephalic on POCUS    EFM: Cat 1, ctx irreg        Assessment / Plan:     IOL  - SVE 3-4/50/-3 in clinic last wk, not rechecked on admission due to patient comfort  - GBS neg  - Cephalic on POCUS  Plan:  - Admit to LDR  - Standard labor mgt  - Pitocin  - SVE PRN  - OK for epidural when desired    A1GDM  - US 5/22 -- Vtx, anterior placenta, nl URIAH, EFW 82nd% 3319gms, AC 97th%, BPP 8/8, UA Dopplers wnl, limited but normal anatomy.   - Wearing Dexcom, will check accuchecks while inpatient        Dispo: Admit to LDR.      Marilynn Bauer MD  6/4/2024  23:17 EDT

## 2024-06-05 NOTE — ANESTHESIA POSTPROCEDURE EVALUATION
Patient: Marilynn Call    Procedure Summary       Date: 06/05/24 Room / [facility]OR DELIVERY 4 /  [facility]    Anesthesia Start: 1631 Anesthesia Stop:     Procedure: DILATATION AND CURETTAGE (Vagina) Diagnosis:     Surgeons: Ny Gutierrez MD Provider: Antonia Remy DO    Anesthesia Type: ITN, spinal ASA Status: 3 - Emergent            Anesthesia Type: ITN, spinal    Vitals  Vitals Value Taken Time   BP 97/64 06/05/24 1745   Temp 97.6 °F (36.4 °C) 06/05/24 1734   Pulse 112 06/05/24 1747   Resp 15    SpO2 100 % 06/05/24 1747   Vitals shown include unfiled device data.        Post Anesthesia Care and Evaluation    Patient location during evaluation: bedside  Patient participation: complete - patient participated  Level of consciousness: awake and awake and alert  Pain score: 0  Pain management: satisfactory to patient    Airway patency: patent  Anesthetic complications: No anesthetic complications  PONV Status: none  Cardiovascular status: acceptable, hemodynamically stable and stable  Respiratory status: acceptable  Hydration status: stable  Post Neuraxial Block status: No signs or symptoms of PDPH

## 2024-06-05 NOTE — OP NOTE
OPERATIVE NOTE     Karyn Call  : 1995  MRN: [id number]  CSN: [id number]  Date: 2024      Pre-op Diagnosis:  Postpartum hemorrhage, uterine atony   Post-op Diagnosis:  Same   Procedure: Dilation and Curettage   Surgeon: Ny Gutierrez MD       Anesthesia: GETA   Estimated Blood Loss: 2598 mls   Fluids: 1500 mls   UOP: 575 mls   ABx: cefazolin 2 gms   Specimens:  None   Findings:  Uterine atony, blood clots   Complications:  Possible DIC       Description of Procedure   Patient taken to the OR with plans for placement of spinal anesthesia and D&C. As patient moved to the OR bed, noted to have a large amount of very watery blood on her chucks. Decision made by Dr. Bobo to use GETA instead. Patient placed on the OR bed and anesthesia induced.     Pt placed in dorsal lithotomy position with candy cane stirrups. Vaginal exam performed and an additional 300+cc of blood clot evacuated from uterus. Cervix run quickly with ring forceps. No signs of cervical laceration. Ultrasound performed by Dr. Soto and uterus noted again to be enlarged. Bladder noted to have reaccumulated significant urine. Straight cath performed and 550cc of clear urine removed.     Then, under ultrasound guidance, curettage with banjo curette performed. Mostly large amount of clots removed. Minimal possible retained POC's if any. Uterus still not jessica appropriately. Curettage performed until gritty tissue noted in all 4 quadrants. Suction curettage performed. Jessica device placed within the uterine cavity and cervical balloon inflated with 60cc of NS. Coyle catheter then placed to gravity.    Per Anesthesia, during procedure, Patient received additional bolus of pitocin, and was started on a second bag of pitocin. She received TXA and IM methergine.     Patient extubated and then returned to PACU for recovery. She is awake and in stable condition.    Plan is for second unit of PRBC's and FFP in PACU. We  will trend labs q 4hrs until equilabrated.     Ny Gutierrez MD   6/5/2024  18:17 EDT

## 2024-06-05 NOTE — ANESTHESIA PROCEDURE NOTES
Airway  Urgency: elective    Date/Time: 6/5/2024 4:51 PM  Airway not difficult    General Information and Staff    Patient location during procedure: OB  Anesthesiologist: Antonia Remy DO    Indications and Patient Condition  Indications for airway management: airway protection    Preoxygenated: yes  Mask difficulty assessment: 0 - not attempted    Final Airway Details  Final airway type: endotracheal airway      Successful airway: ETT  Cuffed: yes   Successful intubation technique: direct laryngoscopy and RSI  Facilitating devices/methods: intubating stylet and cricoid pressure  Endotracheal tube insertion site: oral  Blade: Mckee  Blade size: 3  ETT size (mm): 6.5  Cormack-Lehane Classification: grade I - full view of glottis  Placement verified by: chest auscultation and capnometry   Measured from: lips  ETT/EBT  to lips (cm): 21  Number of attempts at approach: 1  Assessment: lips, teeth, and gum same as pre-op and atraumatic intubation

## 2024-06-05 NOTE — ANESTHESIA PREPROCEDURE EVALUATION
Anesthesia Evaluation     Patient summary reviewed and Nursing notes reviewed   NPO Solid Status: Waived due to emergency  NPO Liquid Status: Waived due to emergency           Airway   Mallampati: II  TM distance: >3 FB  Neck ROM: full  No difficulty expected  Dental - normal exam     Pulmonary - negative pulmonary ROS   Cardiovascular - negative cardio ROS        Neuro/Psych  (+) headaches, psychiatric history Anxiety and Depression  GI/Hepatic/Renal/Endo    (+) GERD poorly controlled, liver disease fatty liver disease, diabetes mellitus gestational    Musculoskeletal (-) negative ROS    Abdominal    Substance History - negative use     OB/GYN    (+) Pregnant        Other - negative ROS                   Anesthesia Plan    ASA 3 - emergent     general     (Full stomach)    Anesthetic plan, risks, benefits, and alternatives have been provided, discussed and informed consent has been obtained with: patient.    Use of blood products discussed with patient .    Plan discussed with Surgeon.    CODE STATUS:    Level Of Support Discussed With: Patient  Code Status (Patient has no pulse and is not breathing): CPR (Attempt to Resuscitate)  Medical Interventions (Patient has pulse or is breathing): Full Support

## 2024-06-05 NOTE — ANESTHESIA PREPROCEDURE EVALUATION
Anesthesia Evaluation     Patient summary reviewed and Nursing notes reviewed   NPO Solid Status: > 6 hours  NPO Liquid Status: > 6 hours           Airway   Dental      Pulmonary - negative pulmonary ROS   Cardiovascular - negative cardio ROS        Neuro/Psych  (+) headaches (Migraines), psychiatric history Depression  GI/Hepatic/Renal/Endo    (+) obesity, GERD, liver disease, diabetes mellitus gestational well controlled    Musculoskeletal (-) negative ROS    Abdominal    Substance History - negative use     OB/GYN    (+) Pregnant        Other                    Anesthesia Plan    ASA 3     epidural       Anesthetic plan, risks, benefits, and alternatives have been provided, discussed and informed consent has been obtained with: patient.    CODE STATUS:    Level Of Support Discussed With: Patient  Code Status (Patient has no pulse and is not breathing): CPR (Attempt to Resuscitate)  Medical Interventions (Patient has pulse or is breathing): Full Support

## 2024-06-05 NOTE — PROGRESS NOTES
"Called to evaluate patient secondary to continued heavy vaginal bleeding and maternal feelings of \"doom.\" Patient with episode of syncope while lying in bed. She is reporting left-sided abdominal burning pain that has now moved to her back.     US done at bedside -- Grossly enlarged uterus with heterogeneous contents noted. One large area with definite appearance of clot    AFVSS  Fundus -- uterus above umbilicus, firm  Abd -- mild RUQ pain with ultrasound  Pelvic -- Large amount of blood/clot evacuated manually    Discussed concerns with patient for her uterus continuing to fill up with blood unless we do a D&C. Recommended that we go to OR for definitive management. Patient agreed to plan.     Patient taken to OR after consult from Dr. Bobo. Plan for spinal anesthesia.     Ny Gutierrez MD      "

## 2024-06-05 NOTE — ANESTHESIA PROCEDURE NOTES
Labor Epidural      Patient reassessed immediately prior to procedure    Patient location during procedure: OB  Performed By  CRNA/CAA: Debra Marina CRNA  Preanesthetic Checklist  Completed: patient identified, IV checked, risks and benefits discussed, surgical consent, monitors and equipment checked, pre-op evaluation and timeout performed  Additional Notes  DPE-25 gauge Sally  Prep:  Pt Position:sitting  Sterile Tech:cap, gloves, mask and sterile barrier  Prep:DuraPrep  Monitoring:blood pressure monitoring  Epidural Block Procedure:  Approach:midline  Guidance:palpation technique  Location:L3-L4  Needle Type:Tuohy  Needle Gauge:17 G  Loss of Resistance Medium: saline  Loss of Resistance: 7cm  Cath Depth at skin:13 cm  Paresthesia: none  Aspiration:negative  Test Dose:negative  Number of Attempts: 1  Post Assessment:  Dressing:occlusive dressing applied and secured with tape  Pt Tolerance:patient tolerated the procedure well with no apparent complications  Complications:no

## 2024-06-05 NOTE — PAYOR COMM NOTE
"Marilynn Farfan (29 y.o. Female)     [employer] ID#[id number]     Delivery Information:  Vaginal Delivery 6/5/24 @ 12:36  Wt 4200 gms  Female  Apgars 8/9    From:Raegan Hawk LPN, Utilization Review  Phone #[phone]  Fax #[phone]        Date of Birth   1995    Social Security Number       Address   [facility] [address] [place] [address]    Home Phone   [phone]    MRN   [id number]       [employer]   None    Marital Status                               Admission Date   6/4/24    Admission Type   Elective    Admitting Provider   Marilynn Bauer MD    Attending Provider   Ny Gutierrez MD    Department, Room/Bed   [facility] LABOR DELIVERY, N314/1       Discharge Date       Discharge Disposition       Discharge Destination                                 Attending Provider: Ny Gutierrez MD    Allergies: No Known Allergies    Isolation: None   Infection: None   Code Status: CPR    Ht: 175.3 cm (69\")   Wt: 105 kg (232 lb)    Admission Cmt: None   Principal Problem: Encounter for induction of labor [Z34.90]                   Active Insurance as of 6/4/2024       Primary Coverage       Payor Plan Insurance Group Employer/Plan Group    [facility] MEDICAID N       Payor Plan Address Payor Plan Phone Number Payor Plan Fax Number Effective Dates    [address] [address] [phone]  10/31/2023 - None Entered    [facility] [id number]         Subscriber Name Subscriber Birth Date Member ID       MARILYNN FARFAN 1995 [id number]                     Emergency Contacts        (Rel.) Home Phone Work Phone Mobile Phone    HAILE FARFAN (Spouse) [phone] -- [phone]    albert gerber (Mother) [phone] -- --    Janeen Varela (Sister) [phone] -- --              Insurance Information                  [facility] MEDICAID Phone: [phone]    Subscriber: Ender Farfansheila Grimes Subscriber#: [id number]    Group#: " [id number] Precert#: --             History & Physical        Marilynn Bauer MD at 24          [facility]  Obstetric History and Physical    Scheduled IOL      Subjective    Patient is a 29 y.o. female  currently at 37w6d, who presents for scheduled IOL for A1GDM. Her prenatal care is notable for: A2 GDM, uterine size-date discrepancy with suspected LGA fetus. No complaints.    Prenatal Information:  Prenatal Results       Initial Prenatal Labs       Test Value Reference Range Date Time    Hemoglobin  10.5 g/dL 12.0 - 15.9 24 1218       11.4 g/dL 12.0 - 15.9 23 1154    Hematocrit  30.9 % 34.0 - 46.6 24 1218       35.0 % 34.0 - 46.6 23 1154    Platelets  191 10*3/mm3 140 - 450 24 1218       201 10*3/mm3 140 - 450 23 1154    Rubella IgG  1.59 index Immune >0.99 23 1154    Hepatitis B SAg  Non-Reactive  Non-Reactive 23 1154    Hepatitis C Ab  Non-Reactive  Non-Reactive 23 1154    RPR  Non-Reactive  Non-Reactive 23 1154    T. Pallidum Ab   Non-Reactive  Non-Reactive 24 1346    ABO  O   23 1154    Rh  Positive   23 1154    Antibody Screen  Negative   23 1154    HIV  Non-Reactive  Non-Reactive 23 1154    Urine Culture        Gonorrhea  Negative  Negative 23 1710    Chlamydia  Negative  Negative 23 1710    TSH  1.090 uIU/mL 0.270 - 4.200 24 1141    HgB A1c         Varicella IgG        HgB Electrophoresis         Hemoglobinopathy (genetic testing)        Cystic fibrosis                   Fetal testing        Test Value Reference Range Date Time    NIPT        MSAFP        AFP-4                  2nd and 3rd Trimester       Test Value Reference Range Date Time    Hemoglobin (repeated)  9.7 g/dL 12.0 - 15.9 24 1346       10.4 g/dL 12.0 - 15.9 24 1649    Hematocrit (repeated)  30.0 % 34.0 - 46.6 24 1346       31.9 % 34.0 - 46.6 24 1649    Platelets   200 10*3/mm3 140 - 450  03/27/24 1346       245 10*3/mm3 140 - 450 03/12/24 1649       191 10*3/mm3 140 - 450 01/30/24 1218       201 10*3/mm3 140 - 450 12/05/23 1154    1 hour GTT   129 mg/dL 65 - 139 03/27/24 1346    Antibody Screen (repeated)        3rd TM syphilis scrn (repeated)  RPR         3rd TM syphilis scrn (repeated) FTA        GTT Fasting        GTT 1 Hr        GTT 2 Hr        GTT 3 Hr        Group B Strep  No Group B Streptococcus isolated   05/22/24 1040              Other testing        Test Value Reference Range Date Time    Parvo IgG         CMV IgG                   Drug Screening       Test Value Reference Range Date Time    Amphetamine Screen        Barbiturate Screen        Benzodiazepine Screen        Methadone Screen        Phencyclidine Screen        Opiates Screen        THC Screen        Cocaine Screen        Propoxyphene Screen        Buprenorphine Screen        Methamphetamine Screen        Oxycodone Screen        Tricyclic Antidepressants Screen                  Legend    ^: Historical                          External Prenatal Results       Pregnancy Outside Results - Transcribed From Office Records - See Scanned Records For Details       Test Value Date Time    ABO  O  12/05/23 1154    Rh  Positive  12/05/23 1154    Antibody Screen  Negative  12/05/23 1154    Varicella IgG       Rubella  1.59 index 12/05/23 1154    Hgb  9.7 g/dL 03/27/24 1346       10.4 g/dL 03/12/24 1649       10.5 g/dL 01/30/24 1218       11.4 g/dL 12/05/23 1154    Hct  30.0 % 03/27/24 1346       31.9 % 03/12/24 1649       30.9 % 01/30/24 1218       35.0 % 12/05/23 1154    HgB A1c        1h GTT  129 mg/dL 03/27/24 1346    3h GTT Fasting       3h GTT 1 hour       3h GTT 2 hour       3h GTT 3 hour        Gonorrhea (discrete)  Negative  11/06/23 1710    Chlamydia (discrete)  Negative  11/06/23 1710    RPR  Non-Reactive  12/05/23 1154    Syphilis Antibody       HBsAg  Non-Reactive  12/05/23 1154    Herpes Simplex Virus PCR       Herpes Simplex  VIrus Culture       HIV  Non-Reactive  23 1154    Hep C RNA Quant PCR       Hep C Antibody  Non-Reactive  23 1154    AFP       NIPT       Cystic Fibroisis        Group B Strep  No Group B Streptococcus isolated  24 1040    GBS Susceptibility to Clindamycin       GBS Susceptibility to Erythromycin       Fetal Fibronectin       Genetic Testing, Maternal Blood                 Drug Screening       Test Value Date Time    Urine Drug Screen       Amphetamine Screen       Barbiturate Screen       Benzodiazepine Screen       Methadone Screen       Phencyclidine Screen       Opiates Screen       THC Screen       Cocaine Screen       Propoxyphene Screen       Buprenorphine Screen       Methamphetamine Screen       Oxycodone Screen       Tricyclic Antidepressants Screen                 Legend    ^: Historical                             Past OB History:     OB History    Para Term  AB Living   3 2 2 0 0 2   SAB IAB Ectopic Molar Multiple Live Births   0 0 0 0 0 2      # Outcome Date GA Lbr Rafael/2nd Weight Sex Type Anes PTL Lv   3 Current            2 Term 10/26/20 38w0d  4111 g (9 lb 1 oz) M Vag-Spont EPI  ADITYA      Complications: Perineal laceration during delivery   1 Term 02/15/17 38w0d  3799 g (8 lb 6 oz) M Vag-Spont EPI  ADITYA      Complications: Perineal laceration during delivery      Obstetric Comments   Fob #1 - Pregnancy #1 and #2        Past Medical History: Past Medical History:   Diagnosis Date    Abscess of breast associated with lactation 2017    Acute mastitis of left breast 2017    GERD (gastroesophageal reflux disease)     Gestational diabetes     History of Ovarian cyst     right and left    History of postpartum depression     vaginal delivery    History of Vaginal delivery     x2 - with laceration    Hx of migraines     Post partum depression 2021    Retinal tear 2020    Sinusitis, chronic     May need nasal surgery after pregnancy       Past Surgical History History reviewed. No pertinent surgical history.   Family History: History reviewed. No pertinent family history.   Social History:  reports that she has never smoked. She has never been exposed to tobacco smoke. She has never used smokeless tobacco.   reports no history of alcohol use.   reports no history of drug use.        General ROS: Pertinent items are noted in HPI, all other systems reviewed and negative    Objective    Vital Signs Range for the last 24 hours  Temperature: Temp:  [98.6 °F (37 °C)] 98.6 °F (37 °C)   Temp Source: Temp src: Oral   BP: BP: (124)/(77) 124/77   Pulse: Heart Rate:  [109] 109   Respirations: Resp:  [18] 18   SPO2:     O2 Amount (l/min):     O2 Devices     Weight: Weight:  [105 kg (232 lb)] 105 kg (232 lb)       Physical Examination:   GEN: alert and oriented, no acute distress  HEENT: atraumatic, normocephalic  CV: well-perfused  PULM: no increased work of breathing  ABD: gravid, nontender  MSK: minimal peripheral edema  NEURO: cranial nerves II - XII grossly intact  PSYCH: mood and affect appropriate    SVE: 3-4/50/-3/medium consistency/mid-position in clinic on 5/29. 4/70/-2 per RN on admission    SSE: N/A    Labs: pend    Imaging: cephalic on POCUS    EFM: Cat 1, ctx irreg        Assessment / Plan:     IOL  - SVE 3-4/50/-3 in clinic last wk, not rechecked on admission due to patient comfort  - GBS neg  - Cephalic on POCUS  Plan:  - Admit to LDR  - Standard labor mgt  - Pitocin  - SVE PRN  - OK for epidural when desired    A1GDM  - US 5/22 -- Vtx, anterior placenta, nl URIAH, EFW 82nd% 3319gms, AC 97th%, BPP 8/8, UA Dopplers wnl, limited but normal anatomy.   - Wearing Dexcom, will check accuchecks while inpatient        Dispo: Admit to LDR.      Marilynn Bauer MD  6/4/2024  23:17 EDT      Electronically signed by Marilynn Bauer MD at 06/04/24 2317       Facility-Administered Medications as of 6/5/2024   Medication Dose Route Frequency Provider Last  Rate Last Admin    acetaminophen (TYLENOL) tablet 650 mg  650 mg Oral Q4H PRN Marilynn Bauer MD        calcium carbonate (TUMS) chewable tablet 500 mg (200 mg elemental)  2 tablet Oral TID PRN Marilynn Bauer MD   2 tablet at 24 0013    carboprost (HEMABATE) injection 250 mcg  250 mcg Intramuscular Q15 Min PRN Marilynn Bauer MD        diphenhydrAMINE (BENADRYL) injection 12.5 mg  12.5 mg Intravenous Q8H PRN Debra Marina CRNA        ePHEDrine Sulfate (Pressors) 5 MG/ML injection 10 mg  10 mg Intravenous Q10 Min PRN Debra Marina CRNA        lactated ringers bolus 1,000 mL  1,000 mL Intravenous Once PRN Marilynn Bauer MD        [COMPLETED] lactated ringers bolus 1,000 mL  1,000 mL Intravenous PRN Debra Marina CRNA 2,000 mL/hr at 24 0905 1,000 mL at 24 0905    lactated ringers infusion  125 mL/hr Intravenous Continuous Marilynn Bauer  mL/hr at 24 0932 125 mL/hr at 24 0932    lidocaine PF 1% (XYLOCAINE) injection 0.5 mL  0.5 mL Intradermal Once PRN Marilynn Bauer MD        methylergonovine (METHERGINE) injection 200 mcg  200 mcg Intramuscular Once PRN Marilynn Bauer MD        metoclopramide (REGLAN) injection 10 mg  10 mg Intravenous Once PRN Debra Marina CRNA        mineral oil liquid 30 mL  30 mL Topical Once Marilynn Bauer MD        miSOPROStol (CYTOTEC) tablet 800 mcg  800 mcg Rectal Once PRN Marilynn Bauer MD        ondansetron (ZOFRAN) injection 4 mg  4 mg Intravenous Once PRN Debra Marina CRNA        [COMPLETED] oxytocin (PITOCIN) 30 units in 0.9% sodium chloride 500 mL (premix)  999 mL/hr Intravenous Once Marilynn Bauer  mL/hr at 24 1242 999 mL/hr at 24 1242    Followed by    [] oxytocin (PITOCIN) 30 units in 0.9% sodium chloride 500 mL (premix)  250 mL/hr Intravenous Continuous Marilynn Bauer MD        oxytocin (PITOCIN) 30 units in 0.9% sodium chloride 500 mL (premix)  2-20 lyle-units/min  "Intravenous Titrated Marilynn Bauer MD 12 mL/hr at 06/05/24 1202 12 lyle-units/min at 06/05/24 1202    ropivacaine (NAROPIN) 0.2 % injection  15 mL/hr Epidural Continuous Debra Marina CRNA   Stopped at 06/05/24 1300    Sod Citrate-Citric Acid (BICITRA) oral solution 30 mL  30 mL Oral Once Debra Marina CRNA        sodium chloride 0.9 % flush 10 mL  10 mL Intravenous Q12H Marilynn Bauer MD        sodium chloride 0.9 % flush 10 mL  10 mL Intravenous PRN Marilynn Bauer MD        sodium chloride 0.9 % infusion 40 mL  40 mL Intravenous PRN Marilynn Bauer MD         Orders (last 24 hrs)        Start     Ordered    06/05/24 1428  POC Glucose Once  PROCEDURE ONCE        Comments: Complete no more than 45 minutes prior to patient eating      06/05/24 1425    06/05/24 1345  oxytocin (PITOCIN) 30 units in 0.9% sodium chloride 500 mL (premix)  Continuous        Placed in \"Followed by\" Linked Group    06/05/24 1244    06/05/24 1341  DIET MESSAGE Please send lunch  Once        Comments: Please send lunch    06/05/24 1341    06/05/24 1330  oxytocin (PITOCIN) 30 units in 0.9% sodium chloride 500 mL (premix)  Once        Placed in \"Followed by\" Linked Group    06/05/24 1244    06/05/24 1249  Tissue Pathology Exam  Once         06/05/24 1248    06/05/24 1245  Notify Provider (Specified)  Continuous        Comments: Open Order Report to View Parameters Requiring Provider Notification    06/05/24 1244    06/05/24 1245  Vital Signs Per Hospital Policy  Per Hospital Policy         06/05/24 1244    06/05/24 1245  Fundal & Lochia Check  Per Order Details        Comments: Every 15 Minutes x4, Then Every 30 Minutes x2, Then Every Shift    06/05/24 1244    06/05/24 1245  Diet: Regular/House; Fluid Consistency: Thin (IDDSI 0)  Diet Effective Now         06/05/24 1244    06/05/24 1245  If indicated -- Please administer RH Immunoglobulin based on results of cord blood evaluation and fetal screen lab tests, pharmacy to " dispense  Per Order Details        Comments: See Process Instructions For Reference Range Details.    06/05/24 1244    06/05/24 1245  Nurse may remove epidural catheter after delivery.  Until Discontinued         06/05/24 1244    06/05/24 1245  Transfer to Postpartum When Criteria Met  Until Discontinued         06/05/24 1244    06/05/24 1244  Fundal & Lochia Check  Every Shift       06/05/24 1244    06/05/24 1244  methylergonovine (METHERGINE) injection 200 mcg  Once As Needed         06/05/24 1244    06/05/24 1244  carboprost (HEMABATE) injection 250 mcg  Every 15 Minutes PRN         06/05/24 1244    06/05/24 1244  miSOPROStol (CYTOTEC) tablet 800 mcg  Once As Needed         06/05/24 1244    06/05/24 1030  ropivacaine (NAROPIN) 0.2 % injection  Continuous         06/05/24 0930    06/05/24 1030  Sod Citrate-Citric Acid (BICITRA) oral solution 30 mL  Once         06/05/24 0930 06/05/24 0931  Vital Signs Per Anesthesia Guidelines  Per Order Details        Comments: Every 3 Minutes x20 Minutes Following Epidural Dosing, Then Every 15 Minutes If Stable    06/05/24 0930    06/05/24 0931  Fetal Heart Rate Monitor  Once         06/05/24 0930 06/05/24 0931  Nurse or anesthesiologist to remain with patient for 15 minutes following dosing.  Until Discontinued         06/05/24 0930    06/05/24 0931  Facilitate maternal postion on side and maintain uterine displacement.  Until Discontinued         06/05/24 0930    06/05/24 0931  Consult anesthesia services prior to changing epidural infusion/rate.  Until Discontinued         06/05/24 0930    06/05/24 0931  Notify physician for the following conditions:  Until Discontinued         06/05/24 0930    06/05/24 0930  lactated ringers bolus 1,000 mL  As Needed         06/05/24 0930    06/05/24 0930  ePHEDrine Sulfate (Pressors) 5 MG/ML injection 10 mg  Every 10 Minutes PRN         06/05/24 0930    06/05/24 0930  metoclopramide (REGLAN) injection 10 mg  Once As Needed          06/05/24 0930    06/05/24 0930  ondansetron (ZOFRAN) injection 4 mg  Once As Needed         06/05/24 0930    06/05/24 0930  diphenhydrAMINE (BENADRYL) injection 12.5 mg  Every 8 Hours PRN         06/05/24 0930    06/05/24 0744  POC Glucose Once  PROCEDURE ONCE        Comments: Complete no more than 45 minutes prior to patient eating      06/05/24 0742    06/05/24 0409  POC Glucose Once  PROCEDURE ONCE        Comments: Complete no more than 45 minutes prior to patient eating      06/05/24 0407    06/05/24 0022  POC Glucose Once  PROCEDURE ONCE        Comments: Complete no more than 45 minutes prior to patient eating      06/05/24 0019    06/05/24 0000  Vital Signs q 4 while awake  Every 4 Hours      Comments: While the patient is awake.    06/04/24 2132 06/04/24 2345  calcium carbonate (TUMS) chewable tablet 500 mg (200 mg elemental)  3 Times Daily PRN         06/04/24 2346    06/04/24 2230  sodium chloride 0.9 % flush 10 mL  Every 12 Hours Scheduled         06/04/24 2132 06/04/24 2230  lactated ringers infusion  Continuous         06/04/24 2132 06/04/24 2230  mineral oil liquid 30 mL  Once         06/04/24 2132 06/04/24 2230  oxytocin (PITOCIN) 30 units in 0.9% sodium chloride 500 mL (premix)  Titrated         06/04/24 2132 06/04/24 2210  Fentanyl, Urine - Urine, Clean Catch  Once         06/04/24 2209 06/04/24 2200  Urine Drug Screen - Urine, Clean Catch  Once         06/04/24 2200 06/04/24 2133  Weigh Patient Daily  Daily       06/04/24 2132 06/04/24 2132  Diet: Liquid; Clear Liquid; Fluid Consistency: Thin (IDDSI 0)  Diet Effective Now,   Status:  Canceled         06/04/24 2132 06/04/24 2131  Admit To Obstetrics Inpatient  Once         06/04/24 2132 06/04/24 2131  Code Status and Medical Interventions:  Continuous         06/04/24 2132 06/04/24 2131  Mini-Prep Prior to Delivery  Once         06/04/24 2132 06/04/24 2131  Continuous Fetal Monitoring With NST on Admission and  Prior to Initiation of Oxytocin.  Per Order Details        Comments: Continuous Fetal Monitoring With NST on Admission & Prior to Initiation of Oxytocin.    24  External Uterine Contraction Monitoring  Per Hospital Policy         24  Notify Provider (Specified)  Continuous        Comments: Open Order Report to View Parameters Requiring Provider Notification    24  Notify Provider of Tachysystole (Per Hospital Algorithm)  Continuous        Comments: Open Order Report to View Parameters Requiring Provider Notification    24  Notify Provider if Membranes Ruptured, Bleeding Greater Than 1 Pad Per Hour, Fetal Heart Tone Abnormality or Severe Pain  Continuous        Comments: Open Order Report to View Parameters Requiring Provider Notification    24  Initiate Group Beta Strep (GBS) Prophylaxis Protocol, If Criteria Met  Continuous        Comments: NO TREATMENT RECOMMENDED IF: 1) Maternal GBS Status Known Negative 2) Scheduled  Birth With Intact Membranes, Not in Labor 3) Maternal GBS Status Unknown, No Risk Factors  TREAT WITH ANTIBIOTICS IF:  1) Maternal GBS Status Known Positive 2) Maternal GBS Status Unknown With Risk Factors: a)  Previous Infant Affected By GBS Infection b) GBS Urinary Tract Infection (UTI) or Bacteriuria During Pregnancy c) Unexplained Maternal Fever (100.4F (38C) or Greater) During Labor d)  Prolonged Rupture of Membranes (18 or More Hours) e) Gestational Age Less Than 37 Weeks    24  Insert Peripheral IV  Once         24  Saline Lock & Maintain IV Access  Continuous         24  Place Sequential Compression Device  Once         24  Maintain Sequential Compression Device  Continuous         24  sodium chloride 0.9 %  flush 10 mL  As Needed         06/04/24 2132 06/04/24 2130  sodium chloride 0.9 % infusion 40 mL  As Needed         06/04/24 2132 06/04/24 2130  lidocaine PF 1% (XYLOCAINE) injection 0.5 mL  Once As Needed         06/04/24 2132 06/04/24 2130  lactated ringers bolus 1,000 mL  Once As Needed         06/04/24 2132 06/04/24 2130  acetaminophen (TYLENOL) tablet 650 mg  Every 4 Hours PRN         06/04/24 2132 06/04/24 2115  Type & Screen  STAT         06/04/24 2114 06/04/24 2115  T Pallidum Antibody w/ reflex RPR (Syphilis)  Once         06/04/24 2114 06/04/24 2114  CBC (No Diff)  STAT         06/04/24 2114 06/04/24 2114  Preeclampsia Panel  STAT         06/04/24 2114    Unscheduled  Position Change - For Intra-Uterine Resusitation for Hypertonus, HyperStimulation or Non-Reassuring Fetal Status  As Needed       06/04/24 2132    Unscheduled  Start IV with #16 or #18 gauge angiocath.  As Needed       06/05/24 0930    Signed and Held  Transfer Patient  Once         Signed and Held    Signed and Held  ferrous sulfate tablet 325 mg  Daily With Breakfast         Signed and Held    Signed and Held  Code Status and Medical Interventions:  Continuous         Signed and Held    Signed and Held  Vital Signs Per Hospital Policy  Per Hospital Policy         Signed and Held    Signed and Held  Notify Physician  Until Discontinued         Signed and Held    Signed and Held  Up Ad Ayse  Until Discontinued         Signed and Held    Signed and Held  Ambulate Patient  Every Shift       Signed and Held    Signed and Held  Diet: Regular/House; Fluid Consistency: Thin (IDDSI 0)  Diet Effective Now         Signed and Held    Signed and Held  Advance Diet As Tolerated -Regular  Until Discontinued         Signed and Held    Signed and Held  Fundal and Lochia Check  Per Hospital Policy        Comments: Q 15 min x 4, Q 30 min x 2, then Q Shift    Signed and Held    Signed and Held  RN to Assess Rh Status & Place RhIG  Evaluation Order if Indicated  Continuous         Signed and Held    Signed and Held  Bladder Assessment  Per Order Details        Comments: Postpartum 1) Upon Admission to Unit & Every 4 Hours PRN Until Voiding. 2) Out of Bed to Void in 8 Hours.    Signed and Held    Signed and Held  Straight Cath  Per Order Details        Comments: Postpartum: If Distended & Unable to Void, May Repeat Once.    Signed and Held    Signed and Held  Indwelling Urinary Catheter  Per Order Details        Comments: Postpartum : After Straight Cathed x2 or if Greater Than 1000mL Residual, Insert Indwelling Urinary Catheter Until Further MD Order.    Signed and Held    Signed and Held  Apply Ice to Perineum  As Needed        Comments: For 20 min q 2 hrs    Signed and Held    Signed and Held  Waffle Cushion  As Needed        Comments: For perineal discomfort    Signed and Held    Signed and Held  Donut Ring  As Needed        Comments: For perineal pain    Signed and Held    Signed and Held  Sitz Bath  3 Times Daily        Comments: PRN    Signed and Held    Signed and Held  Kpad  As Needed        Comments: For pain    Signed and Held    Signed and Held  Warm compress  As Needed         Signed and Held    Signed and Held  Breast pump to bed  Once         Signed and Held    Signed and Held  Apply ace wrap, tight bra, or binder  As Needed         Signed and Held    Signed and Held  Apply ice packs  As Needed         Signed and Held    Signed and Held  If indicated -- Please administer RH Immunoglobulin based on results of cord blood evaluation and fetal screen lab tests, pharmacy to dispense  Continuous        Comments: See process instructions for reference range details.    Signed and Held    Signed and Held  Hemoglobin & Hematocrit, Blood  Timed        Comments: Postpartum Day 1      Signed and Held    Signed and Held  sodium chloride 0.9 % flush 1-10 mL  As Needed         Signed and Held    Signed and Held  oxytocin (PITOCIN) 30 units in  "0.9% sodium chloride 500 mL (premix)  Once As Needed         Signed and Held    Signed and Held  HYDROcodone-acetaminophen (NORCO) 5-325 MG per tablet 1 tablet  Every 4 Hours PRN        Placed in \"Or\" Linked Group    Signed and Held    Signed and Held  HYDROcodone-acetaminophen (NORCO)  MG per tablet 1 tablet  Every 4 Hours PRN        Placed in \"Or\" Linked Group    Signed and Held    Signed and Held  bisacodyl (DULCOLAX) suppository 10 mg  Daily PRN         Signed and Held    Signed and Held  magnesium hydroxide (MILK OF MAGNESIA) suspension 10 mL  Daily PRN         Signed and Held    Signed and Held  docusate sodium (COLACE) capsule 100 mg  2 Times Daily         Signed and Held    Signed and Held  lanolin topical 1 Application  Every 1 Hour PRN         Signed and Held    Signed and Held  benzocaine-menthol (DERMOPLAST) 20-0.5 % topical spray  As Needed         Signed and Held    Signed and Held  witch hazel-glycerin (TUCKS) pad  As Needed         Signed and Held    Signed and Held  Hydrocortisone (Perianal) (ANUSOL-HC) 2.5 % rectal cream 1 Application  As Needed         Signed and Held    Signed and Held  benzocaine (AMERICAINE) 20 % rectal ointment 1 Application  As Needed         Signed and Held    Signed and Held  Place Sequential Compression Device  Once         Signed and Held    Signed and Held  Maintain Sequential Compression Device  Continuous         Signed and Held    Signed and Held  acetaminophen (TYLENOL) tablet 650 mg  Every 6 Hours         Signed and Held    Signed and Held  ibuprofen (ADVIL,MOTRIN) tablet 600 mg  Every 6 Hours Scheduled         Signed and Held    Signed and Held  simethicone (MYLICON) chewable tablet 80 mg  4 Times Daily         Signed and Held    Signed and Held  hydrOXYzine (ATARAX) tablet 50 mg  Nightly PRN         Signed and Held                     Operative/Procedure Notes (last 24 hours)        Ny Gutierrez MD at 06/05/24 1348          [facility] " Delivery Note   Review the Delivery Report for details.       Delivery     Delivery: Vaginal, Spontaneous     YOB: 2024    Time of Birth:  Gestational Age 12:36 PM   38w0d     Anesthesia: Epidural     Delivering clinician: Ny Gutierrez    Forceps?   No   Vacuum? No    Shoulder dystocia present: No        Delivery narrative:  Patient complained of pressure and was checked by Nurse and found to be fully dilated and +1 station. After getting patient up in stirrups and gowning and gloving, patient was rechecked and was confirmed FD/+1 in left occiput transverse presentation. Patient pushed through 2 contractions and baby rotated visibly into left occiput posterior position and +5 station. Head then delivered atraumatically and body followed soon therafter with minimal traction and small maternal pushes. No nuchal cord was noted. After delivery of head, copious additional amniotic fluid was released. Baby was placed on maternal abdomen. Delayed cord clamping was performed x 1 minute. Cord blood was obtained.      Placenta did not immediately release and there was brisk vaginal bleeding noted. Hand placed within the uterus and found the majority of the placenta was detached except a small cotyledon still attached at the fundus. Manual removal of the placenta was performed at the fundus. Uterus immediately clamped down and had excellent tone with minimal bleeding therafter. Placenta was inspected on both sides and found to be intact. Placenta sent to Path.      Small perineal 1st degree laceration repaired with 3-0 vicryl on an SH needle in usual fasion.     Infant    Findings: female  infant     Infant observations: Weight: 4200 g (9 lb 4.2 oz)   Length: 20.25  in  Observations/Comments:        Apgars: 8  @ 1 minute /    9  @ 5 minutes   Infant Name: Falynn     Placenta, Cord, and Fluid    Placenta delivered  Manual removal  at   6/5/2024 12:40 PM     Cord: 3 vessels  present.   Nuchal Cord?  no   Cord  "blood obtained: Yes    Cord gases obtained:  No    Cord gas results: Venous:  No results found for: \"PHCVEN\", \"BECVEN\"    Arterial:  No results found for: \"PHCART\", \"BECART\"     Repair    Episiotomy: Not recorded     No    Lacerations: Yes  Laceration Information  Laceration Repaired?   Perineal: 1st  Yes    Periurethral:       Labial:       Sulcus:       Vaginal:       Cervical:         Suture used for repair: 3-0 Vicryl  Laceration Length for 3rd or 4th degree lacerations: N/A cm   Estimated Blood Loss:  800ml             Quantitative Blood Loss: 1413                 Complications  none    Disposition  Mother to Mother Baby/Postpartum  in stable condition currently.  Baby to NBN  in stable condition currently.      Ny Gutierrez MD  06/05/24  14:14 EDT          Electronically signed by Ny Gutierrez MD at 06/05/24 1416       Physician Progress Notes (last 24 hours)  Notes from 06/04/24 1459 through 06/05/24 1459   No notes of this type exist for this encounter.       "

## 2024-06-05 NOTE — L&D DELIVERY NOTE
[facility]  Vaginal Delivery Note   Review the Delivery Report for details.       Delivery     Delivery: Vaginal, Spontaneous     YOB: 2024    Time of Birth:  Gestational Age 12:36 PM   38w0d     Anesthesia: Epidural     Delivering clinician: Ny Gutierrez    Forceps?   No   Vacuum? No    Shoulder dystocia present: No        Delivery narrative:  Patient complained of pressure and was checked by Nurse and found to be fully dilated and +1 station. After getting patient up in stirrups and gowning and gloving, patient was rechecked and was confirmed FD/+1 in left occiput transverse presentation. Patient pushed through 2 contractions and baby rotated visibly into left occiput posterior position and +5 station. Head then delivered atraumatically and body followed soon therafter with minimal traction and small maternal pushes. No nuchal cord was noted. After delivery of head, copious additional amniotic fluid was released. Baby was placed on maternal abdomen. Delayed cord clamping was performed x 1 minute. Cord blood was obtained.      Placenta did not immediately release and there was brisk vaginal bleeding noted. Hand placed within the uterus and found the majority of the placenta was detached except a small cotyledon still attached at the fundus. Manual removal of the placenta was performed at the fundus. Uterus immediately clamped down and had excellent tone with minimal bleeding therafter. Placenta was inspected on both sides and found to be intact. Placenta sent to Path.      Small perineal 1st degree laceration repaired with 3-0 vicryl on an SH needle in usual fasion.     Infant    Findings: female  infant     Infant observations: Weight: 4200 g (9 lb 4.2 oz)   Length: 20.25  in  Observations/Comments:        Apgars: 8  @ 1 minute /    9  @ 5 minutes   Infant Name: Falynn     Placenta, Cord, and Fluid    Placenta delivered  Manual removal  at   6/5/2024 12:40 PM     Cord: 3 vessels  present.  "  Nuchal Cord?  no   Cord blood obtained: Yes    Cord gases obtained:  No    Cord gas results: Venous:  No results found for: \"PHCVEN\", \"BECVEN\"    Arterial:  No results found for: \"PHCART\", \"BECART\"     Repair    Episiotomy: Not recorded     No    Lacerations: Yes  Laceration Information  Laceration Repaired?   Perineal: 1st  Yes    Periurethral:       Labial:       Sulcus:       Vaginal:       Cervical:         Suture used for repair: 3-0 Vicryl  Laceration Length for 3rd or 4th degree lacerations: N/A cm   Estimated Blood Loss:  800ml             Quantitative Blood Loss: 1413                 Complications  none    Disposition  Mother to Mother Baby/Postpartum  in stable condition currently.  Baby to NBN  in stable condition currently.      Ny Gutierrez MD  06/05/24  14:14 EDT        "

## 2024-06-06 LAB
ALBUMIN SERPL-MCNC: 2.1 G/DL (ref 3.5–5.2)
ALBUMIN/GLOB SERPL: 1.3 G/DL
ALP SERPL-CCNC: 69 U/L (ref 39–117)
ALT SERPL W P-5'-P-CCNC: 7 U/L (ref 1–33)
ANION GAP SERPL CALCULATED.3IONS-SCNC: 8 MMOL/L (ref 5–15)
APTT PPP: 29.7 SECONDS (ref 22–39)
AST SERPL-CCNC: 26 U/L (ref 1–32)
BH BB BLOOD EXPIRATION DATE: NORMAL
BH BB BLOOD TYPE BARCODE: 5100
BH BB DISPENSE STATUS: NORMAL
BH BB PRODUCT CODE: NORMAL
BH BB UNIT NUMBER: NORMAL
BILIRUB SERPL-MCNC: <0.2 MG/DL (ref 0–1.2)
BUN SERPL-MCNC: 6 MG/DL (ref 6–20)
BUN/CREAT SERPL: 8.5 (ref 7–25)
CA-I SERPL ISE-MCNC: 1.15 MMOL/L (ref 1.12–1.32)
CALCIUM SPEC-SCNC: 7 MG/DL (ref 8.6–10.5)
CHLORIDE SERPL-SCNC: 106 MMOL/L (ref 98–107)
CO2 SERPL-SCNC: 23 MMOL/L (ref 22–29)
CREAT SERPL-MCNC: 0.71 MG/DL (ref 0.57–1)
CROSSMATCH INTERPRETATION: NORMAL
CROSSMATCH INTERPRETATION: NORMAL
DEPRECATED RDW RBC AUTO: 46.1 FL (ref 37–54)
DEPRECATED RDW RBC AUTO: 46.7 FL (ref 37–54)
EGFRCR SERPLBLD CKD-EPI 2021: 118.2 ML/MIN/1.73
ERYTHROCYTE [DISTWIDTH] IN BLOOD BY AUTOMATED COUNT: 15.1 % (ref 12.3–15.4)
ERYTHROCYTE [DISTWIDTH] IN BLOOD BY AUTOMATED COUNT: 15.4 % (ref 12.3–15.4)
FIBRINOGEN PPP-MCNC: 304 MG/DL (ref 203–470)
GLOBULIN UR ELPH-MCNC: 1.6 GM/DL
GLUCOSE SERPL-MCNC: 120 MG/DL (ref 65–99)
HCT VFR BLD AUTO: 18.9 % (ref 34–46.6)
HCT VFR BLD AUTO: 27.2 % (ref 34–46.6)
HGB BLD-MCNC: 6.2 G/DL (ref 12–15.9)
HGB BLD-MCNC: 8.9 G/DL (ref 12–15.9)
INR PPP: 1.02 (ref 0.89–1.12)
MCH RBC QN AUTO: 27.5 PG (ref 26.6–33)
MCH RBC QN AUTO: 27.8 PG (ref 26.6–33)
MCHC RBC AUTO-ENTMCNC: 32.7 G/DL (ref 31.5–35.7)
MCHC RBC AUTO-ENTMCNC: 32.8 G/DL (ref 31.5–35.7)
MCV RBC AUTO: 84 FL (ref 79–97)
MCV RBC AUTO: 84.8 FL (ref 79–97)
PLATELET # BLD AUTO: 132 10*3/MM3 (ref 140–450)
PLATELET # BLD AUTO: 153 10*3/MM3 (ref 140–450)
PMV BLD AUTO: 10 FL (ref 6–12)
PMV BLD AUTO: 10.1 FL (ref 6–12)
POTASSIUM SERPL-SCNC: 3.8 MMOL/L (ref 3.5–5.2)
PROT SERPL-MCNC: 3.7 G/DL (ref 6–8.5)
PROTHROMBIN TIME: 13.5 SECONDS (ref 12.2–14.5)
RBC # BLD AUTO: 2.23 10*6/MM3 (ref 3.77–5.28)
RBC # BLD AUTO: 3.24 10*6/MM3 (ref 3.77–5.28)
SODIUM SERPL-SCNC: 137 MMOL/L (ref 136–145)
UNIT  ABO: NORMAL
UNIT  RH: NORMAL
WBC NRBC COR # BLD AUTO: 7.97 10*3/MM3 (ref 3.4–10.8)
WBC NRBC COR # BLD AUTO: 8.03 10*3/MM3 (ref 3.4–10.8)

## 2024-06-06 PROCEDURE — 85610 PROTHROMBIN TIME: CPT | Performed by: OBSTETRICS & GYNECOLOGY

## 2024-06-06 PROCEDURE — P9016 RBC LEUKOCYTES REDUCED: HCPCS

## 2024-06-06 PROCEDURE — 86900 BLOOD TYPING SEROLOGIC ABO: CPT

## 2024-06-06 PROCEDURE — 25010000002 FUROSEMIDE PER 20 MG: Performed by: OBSTETRICS & GYNECOLOGY

## 2024-06-06 PROCEDURE — 25010000002 KETOROLAC TROMETHAMINE PER 15 MG: Performed by: OBSTETRICS & GYNECOLOGY

## 2024-06-06 PROCEDURE — 25010000002 CEFAZOLIN PER 500 MG: Performed by: OBSTETRICS & GYNECOLOGY

## 2024-06-06 PROCEDURE — 85384 FIBRINOGEN ACTIVITY: CPT | Performed by: OBSTETRICS & GYNECOLOGY

## 2024-06-06 PROCEDURE — 36430 TRANSFUSION BLD/BLD COMPNT: CPT

## 2024-06-06 PROCEDURE — 82330 ASSAY OF CALCIUM: CPT | Performed by: OBSTETRICS & GYNECOLOGY

## 2024-06-06 PROCEDURE — 85730 THROMBOPLASTIN TIME PARTIAL: CPT | Performed by: OBSTETRICS & GYNECOLOGY

## 2024-06-06 PROCEDURE — 85027 COMPLETE CBC AUTOMATED: CPT | Performed by: OBSTETRICS & GYNECOLOGY

## 2024-06-06 PROCEDURE — 80053 COMPREHEN METABOLIC PANEL: CPT | Performed by: OBSTETRICS & GYNECOLOGY

## 2024-06-06 RX ORDER — KETOROLAC TROMETHAMINE 15 MG/ML
15 INJECTION, SOLUTION INTRAMUSCULAR; INTRAVENOUS ONCE
Status: COMPLETED | OUTPATIENT
Start: 2024-06-06 | End: 2024-06-06

## 2024-06-06 RX ORDER — FUROSEMIDE 10 MG/ML
40 INJECTION INTRAMUSCULAR; INTRAVENOUS ONCE
Status: COMPLETED | OUTPATIENT
Start: 2024-06-06 | End: 2024-06-06

## 2024-06-06 RX ORDER — FUROSEMIDE 10 MG/ML
20 INJECTION INTRAMUSCULAR; INTRAVENOUS ONCE
Status: COMPLETED | OUTPATIENT
Start: 2024-06-06 | End: 2024-06-06

## 2024-06-06 RX ORDER — ACETAMINOPHEN 325 MG/1
650 TABLET ORAL ONCE
Status: COMPLETED | OUTPATIENT
Start: 2024-06-06 | End: 2024-06-06

## 2024-06-06 RX ORDER — IBUPROFEN 600 MG/1
600 TABLET ORAL EVERY 6 HOURS
Status: DISCONTINUED | OUTPATIENT
Start: 2024-06-06 | End: 2024-06-06

## 2024-06-06 RX ORDER — FUROSEMIDE 10 MG/ML
40 INJECTION INTRAMUSCULAR; INTRAVENOUS ONCE
Status: DISCONTINUED | OUTPATIENT
Start: 2024-06-06 | End: 2024-06-06

## 2024-06-06 RX ORDER — IBUPROFEN 600 MG/1
600 TABLET ORAL EVERY 6 HOURS SCHEDULED
Status: DISCONTINUED | OUTPATIENT
Start: 2024-06-06 | End: 2024-06-08 | Stop reason: HOSPADM

## 2024-06-06 RX ADMIN — IBUPROFEN 600 MG: 600 TABLET, FILM COATED ORAL at 18:11

## 2024-06-06 RX ADMIN — SIMETHICONE 80 MG: 80 TABLET, CHEWABLE ORAL at 14:42

## 2024-06-06 RX ADMIN — FUROSEMIDE 20 MG: 10 INJECTION, SOLUTION INTRAMUSCULAR; INTRAVENOUS at 03:24

## 2024-06-06 RX ADMIN — ACETAMINOPHEN 650 MG: 325 TABLET ORAL at 20:05

## 2024-06-06 RX ADMIN — IBUPROFEN 600 MG: 600 TABLET, FILM COATED ORAL at 11:27

## 2024-06-06 RX ADMIN — SODIUM CHLORIDE 2000 MG: 900 INJECTION INTRAVENOUS at 01:51

## 2024-06-06 RX ADMIN — KETOROLAC TROMETHAMINE 15 MG: 15 INJECTION, SOLUTION INTRAMUSCULAR; INTRAVENOUS at 04:01

## 2024-06-06 RX ADMIN — ACETAMINOPHEN 650 MG: 325 TABLET ORAL at 08:12

## 2024-06-06 RX ADMIN — ACETAMINOPHEN 650 MG: 325 TABLET ORAL at 14:42

## 2024-06-06 RX ADMIN — ACETAMINOPHEN 650 MG: 325 TABLET ORAL at 01:52

## 2024-06-06 RX ADMIN — FUROSEMIDE 40 MG: 10 INJECTION, SOLUTION INTRAMUSCULAR; INTRAVENOUS at 14:27

## 2024-06-06 RX ADMIN — SIMETHICONE 80 MG: 80 TABLET, CHEWABLE ORAL at 09:54

## 2024-06-06 RX ADMIN — SIMETHICONE 80 MG: 80 TABLET, CHEWABLE ORAL at 21:27

## 2024-06-06 RX ADMIN — Medication: at 23:13

## 2024-06-06 NOTE — PROGRESS NOTES
Karyn Call  : 1995  MRN: [id number]  CSN: [id number]    Hospital Day: 3    Postpartum Day #1  Subjective     CC: hospital follow-up IOL for ANTHONY Subramanian says she actually feels better this morning after some sleep.   Her bleeding is minimal currently..   She is having some uterine cramping.  Denies SOA and chest pain.    She has not yet ambulated.  She did tolerate some PO intake      Objective     Min/max vitals past 24 hours:   Temp  Min: 97.6 °F (36.4 °C)  Max: 99.8 °F (37.7 °C)  BP  Min: 80/46  Max: 129/73  Pulse  Min: 63  Max: 138  Resp  Min: 16  Max: 18             Chest:    Breathing is unlaboured          Heart:   Currently regular rate.   Abdomen: soft,. Fundus firm and non-tender   Abdomen non distended   Pelvic: deferred     Results from last 7 days   Lab Units 24  0608 24  2207 24  1802   WBC 10*3/mm3 7.97 10.90* 12.52*   HEMOGLOBIN g/dL 6.2* 7.5* 7.1*   HEMATOCRIT % 18.9* 22.9* 21.9*   PLATELETS 10*3/mm3 132* 149 143     Lab Results   Component Value Date    RH Positive 2024    HEPBSAG Non-Reactive 2023     Results from last 7 days   Lab Units 24  2257   TREPONEMA PALLIDUM AB TOTAL  Non-Reactive                Assessment   Postpartum Day #1 S/P vaginal delivery  S/P D&C due to post partum hemorrhage       Plan   Will give 2 more units of PRBC followed by a dose of Lasix   Repeat labs 4 hours after blood administration.  Likely transfer to [facility] after cleared by Dr. Gutierrez.  Continue post partum care     Michael Haddad MD  2024  07:07 EDT

## 2024-06-06 NOTE — PLAN OF CARE
Problem: Adult Inpatient Plan of Care  Goal: Plan of Care Review  Outcome: Met  Flowsheets (Taken 6/6/2024 6211)  Plan of Care Reviewed With:   patient   spouse     Problem: Adult Inpatient Plan of Care  Goal: Absence of Hospital-Acquired Illness or Injury  Outcome: Met     Problem: Adult Inpatient Plan of Care  Goal: Optimal Comfort and Wellbeing  Outcome: Met     Problem: Adult Inpatient Plan of Care  Goal: Readiness for Transition of Care  Outcome: Met     Problem: Skin Injury Risk Increased  Goal: Skin Health and Integrity  Outcome: Met   Goal Outcome Evaluation:  Plan of Care Reviewed With: patient, spouse

## 2024-06-06 NOTE — ANESTHESIA POSTPROCEDURE EVALUATION
Patient: Marilynn Call    Procedure Summary       Date: 06/05/24 Room / Location: [facility] LABOR DELIVERY 4 /  [facility] LABOR DELIVERY    Anesthesia Start: 1631 Anesthesia Stop: 1750    Procedure: DILATATION AND CURETTAGE (Vagina) Diagnosis:     Surgeons: Ny Gutierrez MD Provider: Antonia Remy DO    Anesthesia Type: ITN, spinal ASA Status: 3 - Emergent            Anesthesia Type: ITN, spinal    Vitals  Vitals Value Taken Time   BP 93/56 06/06/24 0850   Temp 97.7 °F (36.5 °C) 06/06/24 0738   Pulse 99 06/06/24 1014   Resp 16 06/06/24 0800   SpO2 98 % 06/06/24 1014   Vitals shown include unfiled device data.        Post Anesthesia Care and Evaluation    Patient location during evaluation: bedside  Patient participation: complete - patient participated  Level of consciousness: awake and alert  Pain management: adequate    Airway patency: patent  Anesthetic complications: No anesthetic complications  PONV Status: none  Cardiovascular status: acceptable  Respiratory status: acceptable  Hydration status: acceptable

## 2024-06-06 NOTE — ANESTHESIA POSTPROCEDURE EVALUATION
Patient: Marilynn Call    Procedure Summary       Date: 06/05/24 Room / Location:     Anesthesia Start: 0937 Anesthesia Stop: 1240    Procedure: LABOR ANALGESIA Diagnosis:     Scheduled Providers:  Provider: Antonia Remy DO    Anesthesia Type: epidural ASA Status: 3            Anesthesia Type: epidural    Vitals  Vitals Value Taken Time   BP 93/56 06/06/24 0850   Temp 97.7 °F (36.5 °C) 06/06/24 0738   Pulse 89 06/06/24 1009   Resp 16 06/06/24 0800   SpO2 99 % 06/06/24 1009   Vitals shown include unfiled device data.        Post Anesthesia Care and Evaluation    Patient location during evaluation: bedside  Patient participation: complete - patient participated  Level of consciousness: awake and alert  Pain management: adequate    Airway patency: patent  Anesthetic complications: No anesthetic complications    Cardiovascular status: acceptable  Respiratory status: acceptable  Hydration status: acceptable  Post Neuraxial Block status: Motor and sensory function returned to baseline and No signs or symptoms of PDPH

## 2024-06-06 NOTE — LACTATION NOTE
24 1000   Maternal Information   Date of Referral 24   Person Making Referral lactation consultant;nurse   Maternal Reason for Referral other (see comments);breastfeeding currently  (see note)   Infant Reason for Referral tight frenulum; infant   Maternal Assessment   Breast Shape Bilateral:;round   Breast Density Bilateral:;soft   Nipples Bilateral:;everted;graspable   Left Nipple Symptoms intact;nontender   Right Nipple Symptoms intact;nontender   Maternal Infant Feeding   Maternal Emotional State receptive;other (see comments)  (frustrated)   Infant Positioning cradle   Signs of Milk Transfer audible swallow;deep jaw excursions noted   Pain with Feeding yes   Pain Location uterine cramping;nipple, right   Pain Description sharp;stabbing   Comfort Measures Before/During Feeding infant position adjusted;latch adjusted;maternal position adjusted;suction broken using finger;other (see comments)  (medium nipple shield in use; small provided to try with next feeding)   Comfort Measures Following Feeding air-drying encouraged;expressed milk applied   Latch Assistance minimal assistance;verbal guidance offered  (assistance only needed due to MOB acuity)   Support Person Involvement actively supporting mother   Milk Expression/Equipment   Breast Pump Type double electric, hospital grade   Breast Pumping   Breast Pumping Interventions frequent pumping encouraged   Lactation Referrals   Lactation Referrals outpatient lactation program   Outpatient Lactation Program Lactation Follow-up Date/Time follow up in 1-2 weeks     RN called LC due to patient reporting she was told she could not breast feed or offer her expressed milk after D&C procedure yesterday afternoon. Encouraged to have MOB begin pumping now, and JYOTI will review meds and speak with MD as needed, prior to offering expressed milk to infant.     JYOTI reviewed eMAR and did not note any meds of concern at time of D&C. JYOTI contacted Dr Gutierrez  "who expressed that this was a misunderstanding, she does not have concerns about medications from procedure, but she did wanted to \"watch MOB closely overnight and not have her go see baby.\" Baby is patient in  nursery, not NICU, and is currently in the room with MOB. Patient is receiving blood products and lasix (L3 - Dr Belcher's recommends monitoring MOB milk supply). LC visited patient and reviewed safety of breastfeeding at this time.    MOB reports breastfeeding two older children for ~3yr each. She weaned last baby around the time this baby was conceived. Reports her older children (now 8yo & 4yo) had oral restrictions that were \"clipped\" by ped at [facility] () prior to discharge. This baby is noted to have VERY tight upper labial frenulum with notch in gumline where it attaches, lingual frenulum is also noted to be restrictive. SLP consult placed. Encouraged follow up with outpatient lactation, FOB noted they live 2hr away in [facility], but prefer to come to [place] for care.     LC assisted MOB to place baby in cradle hold. MOB with limited comfortable mobility due to BP cuff, O2 sensor, and IV placement. MOB had medium shield at bedside. Infant latched, but MOB reports pain. Latch broken and adjustments made to positioning, but MOB continues to report pain with latch. Infant did nurse for between 5-10 min. Then MOB noted intense uterine cramping. Nursing session stopped and infant placed skin to skin. Infant began \"breast crawl\" and rooting. MOB has large amount of expressed milk at bedside, ~45ml total. Encouraged to offer EBM via bottle, starting with bottle that had 15ml. Briefly reviewed paced bottle feeding.     Small nipple shield provided to use with need feeding to see if that provides more comfortable latch. Reviewed pump washing with FOB, basin and dish soap provided. Encouraged to call as needs arise.   "

## 2024-06-07 LAB
BH BB BLOOD EXPIRATION DATE: NORMAL
BH BB BLOOD TYPE BARCODE: 5100
BH BB DISPENSE STATUS: NORMAL
BH BB PRODUCT CODE: NORMAL
BH BB UNIT NUMBER: NORMAL
CROSSMATCH INTERPRETATION: NORMAL
UNIT  ABO: NORMAL
UNIT  RH: NORMAL

## 2024-06-07 PROCEDURE — 0503F POSTPARTUM CARE VISIT: CPT | Performed by: OBSTETRICS & GYNECOLOGY

## 2024-06-07 RX ADMIN — IBUPROFEN 600 MG: 600 TABLET, FILM COATED ORAL at 12:16

## 2024-06-07 RX ADMIN — IBUPROFEN 600 MG: 600 TABLET, FILM COATED ORAL at 17:48

## 2024-06-07 RX ADMIN — SIMETHICONE 80 MG: 80 TABLET, CHEWABLE ORAL at 12:16

## 2024-06-07 RX ADMIN — IBUPROFEN 600 MG: 600 TABLET, FILM COATED ORAL at 00:02

## 2024-06-07 RX ADMIN — ACETAMINOPHEN 650 MG: 325 TABLET ORAL at 08:20

## 2024-06-07 RX ADMIN — IBUPROFEN 600 MG: 600 TABLET, FILM COATED ORAL at 05:40

## 2024-06-07 RX ADMIN — ACETAMINOPHEN 650 MG: 325 TABLET ORAL at 14:14

## 2024-06-07 RX ADMIN — HYDROCODONE BITARTRATE AND ACETAMINOPHEN 1 TABLET: 10; 325 TABLET ORAL at 14:51

## 2024-06-07 RX ADMIN — ACETAMINOPHEN 650 MG: 325 TABLET ORAL at 20:07

## 2024-06-07 RX ADMIN — SIMETHICONE 80 MG: 80 TABLET, CHEWABLE ORAL at 17:48

## 2024-06-07 RX ADMIN — SIMETHICONE 80 MG: 80 TABLET, CHEWABLE ORAL at 08:20

## 2024-06-07 RX ADMIN — ACETAMINOPHEN 650 MG: 325 TABLET ORAL at 02:12

## 2024-06-07 NOTE — PROGRESS NOTES
Patient overall feeling better today. Was able to shower without issues. Bleeding minimal at this time. Female infant is having a hard time latching but she is pumping. Still with little ambulation and not feeling the urge to urinate. We discussed frequent times urination and increased ambulation. We discussed that given blood loss and need for replacement she may feel more symptomatic with increased activity. Hgb this am was 8.9. Patient to ambulate this afternoon and if stable can be discharged home if desired. Plan for follow up in 1 week outpatient.     Luis Armando Myles MD, FACOG  Maternal Fetal Medicine, [facility]

## 2024-06-07 NOTE — LACTATION NOTE
24 1250   Maternal Information   Date of Referral 24   Person Making Referral lactation consultant;nurse   Maternal Reason for Referral latch difficulty;other (see comments)  (latch painful even with nipple shield, speech has seen patient.  Patient is attempting to put infant to the breast but is too painful.  Speech to follow up.)   Infant Reason for Referral tight frenulum; infant   Maternal Assessment   Breast Shape Bilateral:;round   Breast Density Bilateral:;soft   Nipples Bilateral:;everted;graspable  (Has a nipple shield at bedside, patient states that it does not decrese the painful latch, infant is clamping down during feeding.)   Left Nipple Symptoms intact;nontender   Right Nipple Symptoms intact;nontender   Maternal Infant Feeding   Maternal Emotional State receptive   Breast Pumping   Breast Pumping Interventions frequent pumping encouraged;post-feed pumping encouraged  (encouraged to pump every 3 hours, after feeds due to blood loss and also supplementing infant.)   Lactation Referrals   Lactation Referrals outpatient lactation program     Patient is giving infant donor breast milk.  Discussed the need to pump every 3hours with supplementing in order to bring in her milk supply.  Discussed the need for supplementing with formula if she is unable to put infant to the breast.  Patient states that latch is very painful, even with a nipple shield.  Discussed paced bottle feeding with a doctor browns bottle.  Encouraged patient to empty her bladder prior to pumping.  Patient states that pumping causes her stomach to cramp.  Discussed frequent emptying bladder.  Encouraged patient to make a follow up appointment in the outpatient lactation clinic.  All questions/concerns answered at this time.

## 2024-06-07 NOTE — PROGRESS NOTES
Karyn Call  : 1995  MRN: [id number]  CSN: [id number]    Hospital Day: 4    Postpartum Day #2  Subjective     CC: hospital follow-up:  vaginal delivery                                         S/P D&C due to post partum hemorrhage     Her pain is well controlled.  Vaginal bleeding is less than a normal period.   She has not yet ambulated much.   She has only gotten up to the toilet and voided.    She is tolerating regular PO intake.   Denies SOA and CP     Objective     Min/max vitals past 24 hours:   Temp  Min: 97.6 °F (36.4 °C)  Max: 98 °F (36.7 °C)  BP  Min: 92/55  Max: 118/59  Pulse  Min: 78  Max: 108  Resp  Min: 16  Max: 18        General: No acute distress    Abdomen: fundus firm and non-tender, non distended    Pelvic: Not performed   Ext: Calves NT     Results from last 7 days   Lab Units 24  1705 24  0608 24  2207   WBC 10*3/mm3 8.03 7.97 10.90*   HEMOGLOBIN g/dL 8.9* 6.2* 7.5*   HEMATOCRIT % 27.2* 18.9* 22.9*   PLATELETS 10*3/mm3 153 132* 149     Lab Results   Component Value Date    RH Positive 2024    HEPBSAG Non-Reactive 2023     Results from last 7 days   Lab Units 24  2257   TREPONEMA PALLIDUM AB TOTAL  Non-Reactive                Assessment   Postpartum Day #2 S/P vaginal delivery  POD #2 S/P D&C   Postpartum hemorrhage      Plan   Still has not yet ambulated much.  Encourage ambulation and and see how she does throughout th day.  If she feels well enough later today she may discharge, if not, will likely discharge tomorrow.      Michael Haddad MD  2024  07:58 EDT

## 2024-06-08 VITALS
WEIGHT: 232 LBS | TEMPERATURE: 98.2 F | HEART RATE: 79 BPM | HEIGHT: 69 IN | BODY MASS INDEX: 34.36 KG/M2 | SYSTOLIC BLOOD PRESSURE: 119 MMHG | DIASTOLIC BLOOD PRESSURE: 71 MMHG | RESPIRATION RATE: 16 BRPM | OXYGEN SATURATION: 96 %

## 2024-06-08 PROCEDURE — 0503F POSTPARTUM CARE VISIT: CPT | Performed by: OBSTETRICS & GYNECOLOGY

## 2024-06-08 RX ORDER — HYDROCODONE BITARTRATE AND ACETAMINOPHEN 5; 325 MG/1; MG/1
1 TABLET ORAL EVERY 6 HOURS PRN
Qty: 10 TABLET | Refills: 0 | Status: SHIPPED | OUTPATIENT
Start: 2024-06-08 | End: 2024-06-11

## 2024-06-08 RX ORDER — IBUPROFEN 600 MG/1
600 TABLET ORAL EVERY 6 HOURS SCHEDULED
Qty: 30 TABLET | Refills: 0 | Status: SHIPPED | OUTPATIENT
Start: 2024-06-08

## 2024-06-08 RX ADMIN — ACETAMINOPHEN 650 MG: 325 TABLET ORAL at 08:44

## 2024-06-08 RX ADMIN — IBUPROFEN 600 MG: 600 TABLET, FILM COATED ORAL at 00:04

## 2024-06-08 RX ADMIN — WITCH HAZEL: 500 SOLUTION RECTAL; TOPICAL at 10:33

## 2024-06-08 RX ADMIN — SIMETHICONE 80 MG: 80 TABLET, CHEWABLE ORAL at 00:04

## 2024-06-08 RX ADMIN — Medication: at 10:33

## 2024-06-08 RX ADMIN — SIMETHICONE 80 MG: 80 TABLET, CHEWABLE ORAL at 08:44

## 2024-06-08 RX ADMIN — IBUPROFEN 600 MG: 600 TABLET, FILM COATED ORAL at 06:13

## 2024-06-08 RX ADMIN — ACETAMINOPHEN 650 MG: 325 TABLET ORAL at 02:17

## 2024-06-08 NOTE — LACTATION NOTE
Infant's weight loss is greater than 10%.  Pediatrician has discussed with patient, a feeding plan.  Floor RN stated that patient did not want to see lactation at this time.  Lactation encourage floor RN to discuss with patient regarding pumping every 3 hours to maintain her milk supply.  To also discuss continuing to do paced bottlefeeding.  And to encourage an outpatient lactation clinic of visit, either at [facility] or a location closer to patient's home.

## 2024-06-08 NOTE — PLAN OF CARE
Goal Outcome Evaluation:         VSS, fundus/lochia/perineum WDL, pain controlled with Motrin and tylenol, pumping occasionally, pt reports she feels well, ready for D/C home today.

## 2024-06-08 NOTE — DISCHARGE SUMMARY
Admission date: 2024  Discharge date: 24    Referring Provider: Ny Gutierrez MD    Admission diagnosis:  Encounter for induction of labor [Z34.90]      S/P postpartum D&C with Jessica 2024    PPH (postpartum hemorrhage)    Postpartum care following  2024       Discharge diagnosis:  Vaginal delivery                                         Post partum hemorrhage                                         Post D&C       Consultants: PDC      Hospital course:  Marilynn presented in labour.   She delivered vaginally, but then was complicated by a postpartum hemorrhage requiring a D&C with Jessica placement.   She received several units of PRBC and FFP.   She stabilized and recovered well from this and did not require any further interventions    She was ready for discharge on PPD #3        Vitals:    24 0805   BP: 119/71   Pulse: 79   Resp: 16   Temp: 98.2 °F (36.8 °C)   SpO2:        GENERAL:  Well-developed, well-nourished in no acute distress.   CHEST:    CTAB  HEART:   Regular rate   ABD:  Soft, non distended.  Firm fundus below umbilicus   EXTREMITIES:  +2 edema  PSYCHIATRIC:  Normal affect and mood.      Discharge condition: stable  Discharge diet   Dietary Orders (From admission, onward)       Start     Ordered    24 0818  DIET MESSAGE Please send Kinyarwanda toast  Once        Comments: Please send Kinyarwanda toast    24 0818    24  Diet: Regular/House; Fluid Consistency: Thin (IDDSI 0)  Diet Effective Now        References:    Diet Order [place]   Question Answer Comment   Diets: Regular/House    Fluid Consistency: Thin (IDDSI 0)        24 1341  DIET MESSAGE Please send lunch  Once        Comments: Please send lunch    24 1341                  Additional Instructions: Call with Nausea, vomiting, fevers, or increased vaginal bleeding or greater than a pad an hour   Medications:      Discharge Medications        Continue These Medications         Instructions Start Date   Dexcom G7  device   1 Device, Does not apply, Continuous      Dexcom G7 Sensor misc   1 each, Does not apply, Every 10 Days             ASK your doctor about these medications        Instructions Start Date   cholecalciferol 250 MCG (94606 UT) capsule  Commonly known as: VITAMIN D3   10,000 Units, Oral, Daily      ferrous sulfate 325 (65 FE) MG tablet   325 mg, Oral, Daily With Breakfast             Disposition:Home or Self Care  Follow up:   PDC (Dr. Gutierrez's office) will call early next week for an appointment to be seen        Michael Haddad MD

## 2024-06-09 ENCOUNTER — HOSPITAL ENCOUNTER (OUTPATIENT)
Facility: HOSPITAL | Age: 29
Discharge: HOME | End: 2024-06-10
Attending: OBSTETRICS & GYNECOLOGY | Admitting: OBSTETRICS & GYNECOLOGY
Payer: COMMERCIAL

## 2024-06-09 VITALS
HEART RATE: 89 BPM | TEMPERATURE: 98 F | RESPIRATION RATE: 16 BRPM | DIASTOLIC BLOOD PRESSURE: 73 MMHG | SYSTOLIC BLOOD PRESSURE: 112 MMHG | OXYGEN SATURATION: 100 %

## 2024-06-09 LAB
ABO GROUP BLD: NORMAL
BLD GP AB SCN SERPL QL: NEGATIVE
DEPRECATED RDW RBC AUTO: 45.6 FL (ref 37–54)
ERYTHROCYTE [DISTWIDTH] IN BLOOD BY AUTOMATED COUNT: 15.2 % (ref 12.3–15.4)
HCT VFR BLD AUTO: 27.7 % (ref 34–46.6)
HGB BLD-MCNC: 8.9 G/DL (ref 12–15.9)
MCH RBC QN AUTO: 27.2 PG (ref 26.6–33)
MCHC RBC AUTO-ENTMCNC: 32.1 G/DL (ref 31.5–35.7)
MCV RBC AUTO: 84.7 FL (ref 79–97)
PLATELET # BLD AUTO: 218 10*3/MM3 (ref 140–450)
PMV BLD AUTO: 9.1 FL (ref 6–12)
RBC # BLD AUTO: 3.27 10*6/MM3 (ref 3.77–5.28)
RH BLD: POSITIVE
T&S EXPIRATION DATE: NORMAL
WBC NRBC COR # BLD AUTO: 6.69 10*3/MM3 (ref 3.4–10.8)

## 2024-06-09 PROCEDURE — 86900 BLOOD TYPING SEROLOGIC ABO: CPT | Performed by: OBSTETRICS & GYNECOLOGY

## 2024-06-09 PROCEDURE — 86901 BLOOD TYPING SEROLOGIC RH(D): CPT | Performed by: OBSTETRICS & GYNECOLOGY

## 2024-06-09 PROCEDURE — 36415 COLL VENOUS BLD VENIPUNCTURE: CPT | Performed by: OBSTETRICS & GYNECOLOGY

## 2024-06-09 PROCEDURE — G0463 HOSPITAL OUTPT CLINIC VISIT: HCPCS

## 2024-06-09 PROCEDURE — 85027 COMPLETE CBC AUTOMATED: CPT | Performed by: OBSTETRICS & GYNECOLOGY

## 2024-06-09 PROCEDURE — G0378 HOSPITAL OBSERVATION PER HR: HCPCS

## 2024-06-09 PROCEDURE — 86850 RBC ANTIBODY SCREEN: CPT | Performed by: OBSTETRICS & GYNECOLOGY

## 2024-06-09 PROCEDURE — 99221 1ST HOSP IP/OBS SF/LOW 40: CPT | Performed by: OBSTETRICS & GYNECOLOGY

## 2024-06-09 PROCEDURE — 25810000003 LACTATED RINGERS SOLUTION: Performed by: OBSTETRICS & GYNECOLOGY

## 2024-06-09 PROCEDURE — 96360 HYDRATION IV INFUSION INIT: CPT

## 2024-06-09 RX ADMIN — SODIUM CHLORIDE, POTASSIUM CHLORIDE, SODIUM LACTATE AND CALCIUM CHLORIDE 1000 ML: 600; 310; 30; 20 INJECTION, SOLUTION INTRAVENOUS at 21:54

## 2024-06-09 RX ADMIN — SODIUM CHLORIDE, POTASSIUM CHLORIDE, SODIUM LACTATE AND CALCIUM CHLORIDE 1000 ML: 600; 310; 30; 20 INJECTION, SOLUTION INTRAVENOUS at 23:24

## 2024-06-10 LAB
CYTO UR: NORMAL
LAB AP CASE REPORT: NORMAL
LAB AP CLINICAL INFORMATION: NORMAL
PATH REPORT.FINAL DX SPEC: NORMAL
PATH REPORT.GROSS SPEC: NORMAL

## 2024-06-10 PROCEDURE — G0378 HOSPITAL OBSERVATION PER HR: HCPCS

## 2024-06-10 NOTE — H&P
"Marilynn Call  1995  [id number]  [id number]    CC: heart racing when standing  HPI:  Patient is 29 y.o. female   s/p   complicated by postpartum hemorrhage and D and C also on  (discharged home yesterday).  Presents with c/o heart racing when she stands up (pt says HR goes up to 130's and stays there until she sits or lies down again).  Symptoms started at ~1400 and has occurred every time she gets up.  Pt denies dizziness, but c/o \"feeling funny.\"  Pt received 5u PRBC's and 2u FFP prior to d/c.  Pt is breast feeding and bleeding is scant. PNC was comp by diet controlled gest diabetes.    PMH:  Current meds: motrin  Illnesses: migraines, sinusitis  Surgeries: D and C, oral surg, colonoscopy X 2  Allergies: NKDA    Past OB History:       OB History    Para Term  AB Living   3 3 3 0 0 3   SAB IAB Ectopic Molar Multiple Live Births   0 0 0 0 0 3      # Outcome Date GA Lbr Rafeal/2nd Weight Sex Type Anes PTL Lv   3 Term 24 38w0d  4200 g (9 lb 4.2 oz) F Vag-Spont EPI N ADITYA      Complications: Postpartum Hemorrhage      Name: Ruddy Call      Apgar1: 8  Apgar5: 9   2 Term 10/26/20 38w0d  4111 g (9 lb 1 oz) M Vag-Spont EPI  ADITYA      Complications: Perineal laceration during delivery   1 Term 02/15/17 38w0d  3799 g (8 lb 6 oz) M Vag-Spont EPI  ADITYA      Complications: Perineal laceration during delivery      Obstetric Comments   Fob #1 - Pregnancy #1 and #2             SH: tob neg , EtOH neg, drugs neg      General ROS: tachycardia, edema.   All other systems reviewed and are negative.      Physical Examination: General appearance - alert, well appearing, and in no distress  Vital signs - /73 (BP Location: Left arm, Patient Position: Standing)   Pulse 89   Temp 98 °F (36.7 °C) (Oral)   Resp 16   LMP 2023 (Exact Date)   SpO2 100%   HEENT: normocephalic, atraumatic,oropharynx clear, appearance of ears and nose normal  Neck - supple, no significant adenopathy, no " thyromegaly  Lymphatics - no palpable lymphadenopathy in the neck or groin, no hepatosplenomegaly  Chest - clear to auscultation, no wheezes, rales or rhonchi, respiratory effort non-labored  Heart - normal rate, regular rhythm, no murmurs, rubs, clicks or gallops, no JVD, trace lower extremity edema  Abdomen - soft, nontender, nondistended, no masses, no hepatosplenomegaly  no rebound tenderness noted, bowel sounds normal  Extremities - trace pedal edema noted, no calf tend  Skin -warm and dry, normal coloration and turgor, no rashes, no suspicious skin lesions noted      Labs:  Results from last 7 days   Lab Units 24  2058   WBC 10*3/mm3 6.69   HEMOGLOBIN g/dL 8.9*   HEMATOCRIT % 27.7*   PLATELETS 10*3/mm3 218     Results from last 7 days   Lab Units 24  0608   SODIUM mmol/L 137   POTASSIUM mmol/L 3.8   CHLORIDE mmol/L 106   CO2 mmol/L 23.0   BUN mg/dL 6   CREATININE mg/dL 0.71   CALCIUM mg/dL 7.0*   BILIRUBIN mg/dL <0.2   ALK PHOS U/L 69   ALT (SGPT) U/L 7   AST (SGOT) U/L 26   GLUCOSE mg/dL 120*     Assessment 1)PPD #4 from  and POD #4 from D and C for uterine atony     Requiring mult units of PRBC's and FFP.  H/H almost identical     To last H/H prior to d/c.  Pt is mildly orthostatic (HR 77 to 89).    Plan 1)will give 2L bolus of LR.  If symptoms improve/resolve will d/c home.  If still   Symptomatic after fluid will transfuse 1u PRBC's.        2)discussed with Dr. Myles.    Yaw Garzon MD  2024  21:56 EDT

## 2024-06-13 ENCOUNTER — POSTPARTUM VISIT (OUTPATIENT)
Dept: OBSTETRICS AND GYNECOLOGY | Facility: HOSPITAL | Age: 29
End: 2024-06-13
Payer: COMMERCIAL

## 2024-06-13 VITALS
SYSTOLIC BLOOD PRESSURE: 119 MMHG | WEIGHT: 216 LBS | BODY MASS INDEX: 31.9 KG/M2 | DIASTOLIC BLOOD PRESSURE: 60 MMHG | HEART RATE: 86 BPM

## 2024-06-17 ENCOUNTER — MATERNAL SCREENING (OUTPATIENT)
Dept: CALL CENTER | Facility: HOSPITAL | Age: 29
End: 2024-06-17
Payer: COMMERCIAL

## 2024-06-17 NOTE — OUTREACH NOTE
Maternal Screening Survey      Flowsheet Row Responses   Facility patient discharged from? [facility]   Attempt successful? No   Unsuccessful attempts Attempt 1              Javier US - Registered Nurse

## 2024-06-17 NOTE — OUTREACH NOTE
Maternal Screening Survey      Flowsheet Row Responses   Facility patient discharged from? [facility]   Attempt successful? Yes   Call start time 144   Call end time 1443   Person spoke with today (if not patient) and relationship patient   EPD Scale: Able to Laugh 0-->as much as she always could   EPD Scale: Looked Forward 0-->as much as she ever did   EPD Scale: Blamed Self 0-->no, never   EPD Scale: Been Anxious 0-->no, not at all   EPD Scale: Felt Panicky 0-->no, not at all   EPD Scale: Things Getting on Top 0-->no, has been coping as well as ever   EPD Scale: Difficulty Sleeping 0-->no, not at all   EPD Scale: Sad or Miserable 0-->no, not at all   EPD Scale: Crying 0-->no, never   EPD Scale: Thought of Harming Self 0-->never   Watts  Depression Score 0   Did any of your parents have problems with alcohol or drug use? No   Do any of your peers have problems with alcohol or drug use? No   Does your partner have problems with alcohol or drug use? No   Before you were pregnant did you have problems with alcohol or drug use? (past) No   In the past month, did you drink beer, wine, liquor or use any other drugs? (pregnancy) No   Maternal Screening call completed Yes   Call end time 1443              Javier US - Registered Nurse

## 2024-06-19 NOTE — PROGRESS NOTES
"     Follow-Up Postpartum Visit     Patient Name: Marilynn Call  : 1995   MRN: [id number]     Subjective     Marilynn Call is a 29 y.o.  s/p  on 24 who is here today for follow-up postpartum visit. Delivery was complicated by PPH and DIC requiring transfusions of multiple blood products, D&C, and Jessica device. Due to rapid onset, feel was likely secondary to occult AFE. Patient required manual removal of placenta and then experienced \"sense of impending doom\" where she almost lost consciousness.     Currently she is doing much better. She is ambulating, voiding, and breast feeding without difficulty. She has only minimal bleeding. She had a very small perineal laceration repaired with  3-0 that she feels is healing well. The cramping post-delivery has been miserable, but otherwise she is doing well now.     Baby is doing beautifully!     Objective     Vitals:    24 1105   BP: 119/60   Pulse: 86   Weight: 98 kg (216 lb)     Body mass index is 31.9 kg/m².    Physical Exam  Constitutional:       Appearance: Normal appearance. She is normal weight.   HENT:      Head: Normocephalic and atraumatic.   Pulmonary:      Effort: Pulmonary effort is normal.   Musculoskeletal:         General: Normal range of motion.   Neurological:      General: No focal deficit present.      Mental Status: She is alert and oriented to person, place, and time.   Psychiatric:         Mood and Affect: Mood normal.         Behavior: Behavior normal.         Thought Content: Thought content normal.         Judgment: Judgment normal.       Lab Results   Component Value Date    CREATININE 0.71 2024    URICACID 3.7 2024    AST 26 2024     (L) 2024    ALT 7 2024    HCT 27.7 (L) 2024    HGB 8.9 (L) 2024     2024    TSH 1.090 2024       Assessment / Plan      Assessment/Plan:   Postpartum check-up secondary to complicated vaginal delivery. Doing very " well. AFVSS. Bleeding minimal. Breast feeding is going well.     Follow Up:   Return in about 2 weeks (around 6/27/2024).    I spent 20 minutes caring for Marilynn on this date of service. This time includes time spent by me in the following activities: preparing for the visit, obtaining and/or reviewing a separately obtained history, counseling and educating the patient/family/caregiver, and documenting information in the medical record    Ny Gutierrez MD

## 2024-07-11 ENCOUNTER — POSTPARTUM VISIT (OUTPATIENT)
Dept: OBSTETRICS AND GYNECOLOGY | Facility: HOSPITAL | Age: 29
End: 2024-07-11
Payer: COMMERCIAL

## 2024-07-11 VITALS
HEIGHT: 69 IN | WEIGHT: 211.4 LBS | RESPIRATION RATE: 16 BRPM | SYSTOLIC BLOOD PRESSURE: 119 MMHG | DIASTOLIC BLOOD PRESSURE: 68 MMHG | TEMPERATURE: 98.3 F | HEART RATE: 65 BPM | BODY MASS INDEX: 31.31 KG/M2

## 2024-07-11 DIAGNOSIS — O24.410 DIET CONTROLLED GESTATIONAL DIABETES MELLITUS (GDM) IN THIRD TRIMESTER: ICD-10-CM

## 2024-07-14 NOTE — PROGRESS NOTES
"    Maternal/Fetal Medicine Postpartum Visit     Name: Marilynn Call    : 1995     MRN: [id number]     Referring Provider: Ny Gutierrez MD    Chief Complaint  Postpartum Care    Subjective     History of Present Illness:  Marilynn Call is a 29 y.o.  4 weeks s/p  complicated by possible occult AFE and PPH (required D&C, Jessica and transfusion of blood products).   Overall well though feels that she is experiencing some PTSD-like symptoms and anxiety.   Physically well   No bleeding   Breastfeeding with success   No bowel or bladder issues   Pregnancy complicated by GDM as well     Past medical, surgical, OB and social history reviewed and no changes noted.    ROS:   14 point review of systems reviewed. Pertinent positives and negatives reviewed in HPI. All other systems negative.    Objective     Vital Signs  /68   Pulse 65   Temp 98.3 °F (36.8 °C) (Oral)   Resp 16   Ht 175.3 cm (69\")   Wt 95.9 kg (211 lb 6.4 oz)   LMP 2023 (Exact Date)   Estimated body mass index is 31.22 kg/m² as calculated from the following:    Height as of this encounter: 175.3 cm (69\").    Weight as of this encounter: 95.9 kg (211 lb 6.4 oz).    Physical Exam:  Gen: no apparent distress   HEENT: normocephalic, atraumatic   Neuro: no focal deficits appreciated   Skin: no rashes or lesions visible.   Pulm: Normal effort   Psych: Alert, oriented       Assessment and Plan     Diagnoses and all orders for this visit:    1. Postpartum care and examination (Primary)  -     Cancel: [facility]; Future  -     Glucose Tolerance, 2 Hours; Future    2. Diet controlled gestational diabetes mellitus (GDM) in third trimester  -     Cancel: [facility]; Future  -     Glucose Tolerance, 2 Hours; Future       Discussed symptoms of PTSD and postpartum anxiety   Provided resources for therapists   Reviewed some medication options that are appropriate with breastfeeding - " patient does not want Zoloft due to weight gain in the past    Ordered 2 hour GTT for 2 weeks from now   Follow up 2 weeks         I spent 20 minutes caring for the patient on the day of service. This included: obtaining or reviewing a separately obtained medical history, reviewing patient records, performing a medically appropriate exam and/or evaluation, counseling or educating the patient/family/caregiver, ordering medications, labs, and/or procedures and documenting such in the medical record. This does not include time spent on review and interpretation of other tests such as fetal ultrasound or the performance of other procedures such as amniocentesis or CVS.    Jenna Cee MD FACOG  Maternal Fetal Medicine, [facility]     2024

## 2024-08-05 ENCOUNTER — POSTPARTUM VISIT (OUTPATIENT)
Dept: OBSTETRICS AND GYNECOLOGY | Facility: HOSPITAL | Age: 29
End: 2024-08-05
Payer: MEDICAID

## 2024-08-05 ENCOUNTER — LAB (OUTPATIENT)
Dept: LAB | Facility: HOSPITAL | Age: 29
End: 2024-08-05
Payer: MEDICAID

## 2024-08-05 VITALS
DIASTOLIC BLOOD PRESSURE: 60 MMHG | TEMPERATURE: 97.5 F | BODY MASS INDEX: 30.36 KG/M2 | HEIGHT: 69 IN | SYSTOLIC BLOOD PRESSURE: 106 MMHG | WEIGHT: 205 LBS | HEART RATE: 62 BPM | RESPIRATION RATE: 18 BRPM

## 2024-08-05 DIAGNOSIS — Z98.890 POST-OPERATIVE STATE: ICD-10-CM

## 2024-08-05 DIAGNOSIS — O24.410 GDM (GESTATIONAL DIABETES MELLITUS), CLASS A1: ICD-10-CM

## 2024-08-05 DIAGNOSIS — O24.410 DIET CONTROLLED GESTATIONAL DIABETES MELLITUS (GDM) IN THIRD TRIMESTER: ICD-10-CM

## 2024-08-05 PROBLEM — K76.0 FATTY LIVER IN MOTHER DURING PREGNANCY: Chronic | Status: RESOLVED | Noted: 2024-02-26 | Resolved: 2024-08-05

## 2024-08-05 PROBLEM — O26.619 FATTY LIVER IN MOTHER DURING PREGNANCY: Chronic | Status: RESOLVED | Noted: 2024-02-26 | Resolved: 2024-08-05

## 2024-08-05 LAB
GLUCOSE 1H P 100 G GLC PO SERPL-MCNC: 104 MG/DL (ref 74–180)
GLUCOSE 2H P 100 G GLC PO SERPL-MCNC: 123 MG/DL (ref 74–155)
GLUCOSE P FAST SERPL-MCNC: 96 MG/DL (ref 74–106)

## 2024-08-05 PROCEDURE — 82948 REAGENT STRIP/BLOOD GLUCOSE: CPT

## 2024-08-05 PROCEDURE — 36415 COLL VENOUS BLD VENIPUNCTURE: CPT

## 2024-08-05 PROCEDURE — 82951 GLUCOSE TOLERANCE TEST (GTT): CPT

## 2024-08-05 NOTE — PROGRESS NOTES
"     Follow-Up Prenatal Visit     Patient Name: Marilynn Call  : 1995   MRN: [id number]     Subjective     Marilynn Call is a 29 y.o.  s/p  at 38w0d which was complicated by likely occult AFE with DIC and PPH. She is here today for follow-up postpartum visit. She thinks she is doing much better from a PTSD standpoint. Her EPDS today is an 8. She does still think about what happened, but it does not interfere with her sleep or her enjoyment of activities. She is overall doing well. Bleeding has stopped. She is breast feeding exclusively and baby is doing beautifully. She did have to cut out dairy as baby was reacting to it. Her biggest issue right nor is a \"pinched nerve\" in her back on her left side for which she is seeing a chiropractor at home and some left lower quadrant abdominal/pelvic pain. She does not want any anti-depressant medications currently.      Objective     Vitals:    24 0830   BP: 106/60   Pulse: 62   Resp: 18   Temp: 97.5 °F (36.4 °C)   TempSrc: Oral   Weight: 93 kg (205 lb)   Height: 175.3 cm (69\")   PainSc: 0-No pain     Body mass index is 30.27 kg/m².        Assessment / Plan      Assessment/Plan:   Postpartum 8wks. Doing really well overall. BP normal. Mood improving. Plans to use condoms until  gets a vasectomy.     Currently in the middle of her 2-hr GTT. Is seeing chiropractor for her back/nerve issue.      - Will order pelvic ultrasound    - Will follow-up GTT results    Follow Up:   No follow-ups on file.    I spent 30 minutes caring for Marilynn on this date of service. This time includes time spent by me in the following activities: preparing for the visit, obtaining and/or reviewing a separately obtained history, counseling and educating the patient/family/caregiver, ordering medications, tests, or procedures, and documenting information in the medical record    Ny Gutierrez MD  "

## 2024-08-08 ENCOUNTER — TELEPHONE (OUTPATIENT)
Dept: OBSTETRICS AND GYNECOLOGY | Facility: HOSPITAL | Age: 29
End: 2024-08-08
Payer: MEDICAID

## 2024-08-08 NOTE — TELEPHONE ENCOUNTER
Spoke with patient over the phone and informed patient that her 2 hour glucose test was normal.  Patient voices understanding.  Shruthi Parikh RN    ----- Message from Jenna Cee sent at 8/7/2024  8:47 PM EDT -----    Please let Marilynn know that her postpartum diabetes test was normal   Thanks!  ----- Message -----  From: Lab, Background User  Sent: 8/5/2024   8:43 AM EDT  To: Jenna Cee MD

## 2025-04-03 NOTE — TELEPHONE ENCOUNTER
Laparoscopic Cholecystectomy performed per Dr. Hanson.  Gallbladder retrieved via endocatch bag.  Bag emptied in its entirety into specimen container for pathology by MEGHAN Pérez CST, observed by Esha WALDROP. (ERIC).   Spoke with patient  about results of Maternity 21.  Informed of negative for trisomy 13, 18 and 21. Pt does not want to know the gender of the baby.  In previous conversation with pt and Dr Gutierrez.  Will Mail patient a copy of her NIPT results and informed patient that she can look on my chart in 2 days for results  and babies gender ,  pt with adam

## (undated) DEVICE — VACUUM-INDUCED HEMORRHAGE CONTROL SYSTEM: Brand: JADA SYSTEM